# Patient Record
Sex: MALE | Race: BLACK OR AFRICAN AMERICAN | NOT HISPANIC OR LATINO | Employment: FULL TIME | URBAN - METROPOLITAN AREA
[De-identification: names, ages, dates, MRNs, and addresses within clinical notes are randomized per-mention and may not be internally consistent; named-entity substitution may affect disease eponyms.]

---

## 2022-09-24 ENCOUNTER — HOSPITAL ENCOUNTER (EMERGENCY)
Facility: HOSPITAL | Age: 27
Discharge: HOME | End: 2022-09-25
Attending: EMERGENCY MEDICINE | Admitting: EMERGENCY MEDICINE

## 2022-09-24 ENCOUNTER — APPOINTMENT (OUTPATIENT)
Dept: CT IMAGING | Facility: HOSPITAL | Age: 27
End: 2022-09-24

## 2022-09-24 ENCOUNTER — APPOINTMENT (OUTPATIENT)
Dept: GENERAL RADIOLOGY | Facility: HOSPITAL | Age: 27
End: 2022-09-24
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
## 2022-09-24 DIAGNOSIS — S09.90XA INJURY OF HEAD, INITIAL ENCOUNTER: ICD-10-CM

## 2022-09-24 DIAGNOSIS — S23.9XXA THORACIC SPRAIN: ICD-10-CM

## 2022-09-24 DIAGNOSIS — S13.9XXA NECK SPRAIN, INITIAL ENCOUNTER: ICD-10-CM

## 2022-09-24 PROCEDURE — 71250 CT THORAX DX C-: CPT

## 2022-09-24 PROCEDURE — 99283 EMERGENCY DEPT VISIT LOW MDM: CPT

## 2022-09-24 PROCEDURE — 70450 CT HEAD/BRAIN W/O DYE: CPT

## 2022-09-24 PROCEDURE — 72125 CT NECK SPINE W/O DYE: CPT

## 2022-09-25 VITALS
RESPIRATION RATE: 14 BRPM | HEART RATE: 72 BPM | HEIGHT: 70 IN | TEMPERATURE: 98.1 F | WEIGHT: 164.8 LBS | DIASTOLIC BLOOD PRESSURE: 78 MMHG | SYSTOLIC BLOOD PRESSURE: 118 MMHG | BODY MASS INDEX: 23.59 KG/M2 | OXYGEN SATURATION: 99 %

## 2022-09-25 RX ORDER — METHOCARBAMOL 500 MG/1
500 TABLET, FILM COATED ORAL 4 TIMES DAILY
Qty: 28 TABLET | Refills: 0 | Status: SHIPPED | OUTPATIENT
Start: 2022-09-25

## 2022-09-25 RX ORDER — MELOXICAM 15 MG/1
15 TABLET ORAL DAILY
Qty: 30 TABLET | Refills: 0 | Status: SHIPPED | OUTPATIENT
Start: 2022-09-25
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
## 2023-08-21 ENCOUNTER — TRANSCRIBE ORDERS (OUTPATIENT)
Dept: ADMINISTRATIVE | Facility: HOSPITAL | Age: 28
End: 2023-08-21
Payer: COMMERCIAL

## 2023-08-21 DIAGNOSIS — R00.2 PALPITATIONS: Primary | ICD-10-CM

## 2024-06-03 ENCOUNTER — TRANSCRIBE ORDERS (OUTPATIENT)
Dept: ADMINISTRATIVE | Facility: HOSPITAL | Age: 29
End: 2024-06-03
Payer: COMMERCIAL

## 2024-06-03 DIAGNOSIS — R91.1 LUNG NODULE: Primary | ICD-10-CM

## 2024-07-15 ENCOUNTER — HOSPITAL ENCOUNTER (OUTPATIENT)
Dept: CT IMAGING | Facility: HOSPITAL | Age: 29
Discharge: HOME | End: 2024-07-15
Admitting: INTERNAL MEDICINE
Payer: MEDICAID

## 2024-07-15 DIAGNOSIS — R91.1 LUNG NODULE: ICD-10-CM

## 2024-07-15 PROCEDURE — 71250 CT THORAX DX C-: CPT

## 2024-09-12 ENCOUNTER — ANESTHESIA (OUTPATIENT)
Dept: PERIOP | Facility: HOSPITAL | Age: 29
End: 2024-09-12
Payer: MEDICAID

## 2024-09-12 ENCOUNTER — HOSPITAL ENCOUNTER (OUTPATIENT)
Facility: HOSPITAL | Age: 29
Discharge: HOME | End: 2024-09-13
Attending: EMERGENCY MEDICINE
Payer: MEDICAID

## 2024-09-12 ENCOUNTER — APPOINTMENT (OUTPATIENT)
Dept: CT IMAGING | Facility: HOSPITAL | Age: 29
End: 2024-09-12
Payer: MEDICAID

## 2024-09-12 ENCOUNTER — APPOINTMENT (OUTPATIENT)
Dept: GENERAL RADIOLOGY | Facility: HOSPITAL | Age: 29
End: 2024-09-12
Payer: MEDICAID

## 2024-09-12 ENCOUNTER — ANESTHESIA EVENT (OUTPATIENT)
Dept: PERIOP | Facility: HOSPITAL | Age: 29
End: 2024-09-12
Payer: MEDICAID

## 2024-09-12 DIAGNOSIS — K37 APPENDICITIS: ICD-10-CM

## 2024-09-12 DIAGNOSIS — K35.200 ACUTE APPENDICITIS WITH GENERALIZED PERITONITIS WITHOUT GANGRENE, PERFORATION, OR ABSCESS: Primary | ICD-10-CM

## 2024-09-12 LAB
ABO GROUP BLD: NORMAL
ALBUMIN SERPL-MCNC: 4.3 G/DL (ref 3.5–5.2)
ALBUMIN SERPL-MCNC: 4.8 G/DL (ref 3.5–5.2)
ALBUMIN/GLOB SERPL: 1.5 G/DL
ALBUMIN/GLOB SERPL: 1.7 G/DL
ALP SERPL-CCNC: 50 U/L (ref 39–117)
ALP SERPL-CCNC: 54 U/L (ref 39–117)
ALT SERPL W P-5'-P-CCNC: 20 U/L (ref 1–41)
ALT SERPL W P-5'-P-CCNC: 20 U/L (ref 1–41)
ANION GAP SERPL CALCULATED.3IONS-SCNC: 11.7 MMOL/L (ref 5–15)
ANION GAP SERPL CALCULATED.3IONS-SCNC: 8.9 MMOL/L (ref 5–15)
APTT PPP: 28.9 SECONDS (ref 24–31)
AST SERPL-CCNC: 18 U/L (ref 1–40)
AST SERPL-CCNC: 24 U/L (ref 1–40)
BASOPHILS # BLD AUTO: 0.02 10*3/MM3 (ref 0–0.2)
BASOPHILS # BLD AUTO: 0.03 10*3/MM3 (ref 0–0.2)
BASOPHILS NFR BLD AUTO: 0.2 % (ref 0–1.5)
BASOPHILS NFR BLD AUTO: 0.2 % (ref 0–1.5)
BILIRUB SERPL-MCNC: 0.5 MG/DL (ref 0–1.2)
BILIRUB SERPL-MCNC: 0.7 MG/DL (ref 0–1.2)
BLD GP AB SCN SERPL QL: NEGATIVE
BUN SERPL-MCNC: 13 MG/DL (ref 6–20)
BUN SERPL-MCNC: 14 MG/DL (ref 6–20)
BUN/CREAT SERPL: 12.5 (ref 7–25)
BUN/CREAT SERPL: 14.4 (ref 7–25)
CALCIUM SPEC-SCNC: 10.6 MG/DL (ref 8.6–10.5)
CALCIUM SPEC-SCNC: 9.5 MG/DL (ref 8.6–10.5)
CHLORIDE SERPL-SCNC: 103 MMOL/L (ref 98–107)
CHLORIDE SERPL-SCNC: 99 MMOL/L (ref 98–107)
CO2 SERPL-SCNC: 26.1 MMOL/L (ref 22–29)
CO2 SERPL-SCNC: 27.3 MMOL/L (ref 22–29)
CREAT SERPL-MCNC: 0.97 MG/DL (ref 0.76–1.27)
CREAT SERPL-MCNC: 1.04 MG/DL (ref 0.76–1.27)
D-LACTATE SERPL-SCNC: 0.9 MMOL/L (ref 0.5–2)
DEPRECATED RDW RBC AUTO: 39.6 FL (ref 37–54)
DEPRECATED RDW RBC AUTO: 40.6 FL (ref 37–54)
EGFRCR SERPLBLD CKD-EPI 2021: 100.3 ML/MIN/1.73
EGFRCR SERPLBLD CKD-EPI 2021: 109 ML/MIN/1.73
EOSINOPHIL # BLD AUTO: 0.03 10*3/MM3 (ref 0–0.4)
EOSINOPHIL # BLD AUTO: 0.16 10*3/MM3 (ref 0–0.4)
EOSINOPHIL NFR BLD AUTO: 0.2 % (ref 0.3–6.2)
EOSINOPHIL NFR BLD AUTO: 1.3 % (ref 0.3–6.2)
ERYTHROCYTE [DISTWIDTH] IN BLOOD BY AUTOMATED COUNT: 12.3 % (ref 12.3–15.4)
ERYTHROCYTE [DISTWIDTH] IN BLOOD BY AUTOMATED COUNT: 12.3 % (ref 12.3–15.4)
GLOBULIN UR ELPH-MCNC: 2.8 GM/DL
GLOBULIN UR ELPH-MCNC: 2.8 GM/DL
GLUCOSE SERPL-MCNC: 113 MG/DL (ref 65–99)
GLUCOSE SERPL-MCNC: 98 MG/DL (ref 65–99)
HCT VFR BLD AUTO: 42.6 % (ref 37.5–51)
HCT VFR BLD AUTO: 47.6 % (ref 37.5–51)
HGB BLD-MCNC: 14.2 G/DL (ref 13–17.7)
HGB BLD-MCNC: 15.5 G/DL (ref 13–17.7)
IMM GRANULOCYTES # BLD AUTO: 0.01 10*3/MM3 (ref 0–0.05)
IMM GRANULOCYTES # BLD AUTO: 0.06 10*3/MM3 (ref 0–0.05)
IMM GRANULOCYTES NFR BLD AUTO: 0.1 % (ref 0–0.5)
IMM GRANULOCYTES NFR BLD AUTO: 0.5 % (ref 0–0.5)
INR PPP: 1.05 (ref 0.93–1.1)
LYMPHOCYTES # BLD AUTO: 1.04 10*3/MM3 (ref 0.7–3.1)
LYMPHOCYTES # BLD AUTO: 3.22 10*3/MM3 (ref 0.7–3.1)
LYMPHOCYTES NFR BLD AUTO: 25.6 % (ref 19.6–45.3)
LYMPHOCYTES NFR BLD AUTO: 7.9 % (ref 19.6–45.3)
MAGNESIUM SERPL-MCNC: 1.8 MG/DL (ref 1.6–2.6)
MCH RBC QN AUTO: 28.8 PG (ref 26.6–33)
MCH RBC QN AUTO: 29.2 PG (ref 26.6–33)
MCHC RBC AUTO-ENTMCNC: 32.6 G/DL (ref 31.5–35.7)
MCHC RBC AUTO-ENTMCNC: 33.3 G/DL (ref 31.5–35.7)
MCV RBC AUTO: 87.5 FL (ref 79–97)
MCV RBC AUTO: 88.5 FL (ref 79–97)
MONOCYTES # BLD AUTO: 0.72 10*3/MM3 (ref 0.1–0.9)
MONOCYTES # BLD AUTO: 0.93 10*3/MM3 (ref 0.1–0.9)
MONOCYTES NFR BLD AUTO: 5.4 % (ref 5–12)
MONOCYTES NFR BLD AUTO: 7.4 % (ref 5–12)
NEUTROPHILS NFR BLD AUTO: 11.37 10*3/MM3 (ref 1.7–7)
NEUTROPHILS NFR BLD AUTO: 65.4 % (ref 42.7–76)
NEUTROPHILS NFR BLD AUTO: 8.25 10*3/MM3 (ref 1.7–7)
NEUTROPHILS NFR BLD AUTO: 85.8 % (ref 42.7–76)
NRBC BLD AUTO-RTO: 0 /100 WBC (ref 0–0.2)
PHOSPHATE SERPL-MCNC: 2.9 MG/DL (ref 2.5–4.5)
PLATELET # BLD AUTO: 208 10*3/MM3 (ref 140–450)
PLATELET # BLD AUTO: 223 10*3/MM3 (ref 140–450)
PMV BLD AUTO: 9.6 FL (ref 6–12)
PMV BLD AUTO: 9.8 FL (ref 6–12)
POTASSIUM SERPL-SCNC: 3.7 MMOL/L (ref 3.5–5.2)
POTASSIUM SERPL-SCNC: 4.3 MMOL/L (ref 3.5–5.2)
PROT SERPL-MCNC: 7.1 G/DL (ref 6–8.5)
PROT SERPL-MCNC: 7.6 G/DL (ref 6–8.5)
PROTHROMBIN TIME: 11.4 SECONDS (ref 9.6–11.7)
QT INTERVAL: 403 MS
QTC INTERVAL: 435 MS
RBC # BLD AUTO: 4.87 10*6/MM3 (ref 4.14–5.8)
RBC # BLD AUTO: 5.38 10*6/MM3 (ref 4.14–5.8)
RH BLD: POSITIVE
SODIUM SERPL-SCNC: 138 MMOL/L (ref 136–145)
SODIUM SERPL-SCNC: 138 MMOL/L (ref 136–145)
T&S EXPIRATION DATE: NORMAL
WBC NRBC COR # BLD AUTO: 12.6 10*3/MM3 (ref 3.4–10.8)
WBC NRBC COR # BLD AUTO: 13.24 10*3/MM3 (ref 3.4–10.8)

## 2024-09-12 PROCEDURE — 25010000002 KETOROLAC TROMETHAMINE PER 15 MG: Performed by: EMERGENCY MEDICINE

## 2024-09-12 PROCEDURE — 25010000002 HYDROMORPHONE 1 MG/ML SOLUTION: Performed by: SURGERY

## 2024-09-12 PROCEDURE — 96365 THER/PROPH/DIAG IV INF INIT: CPT

## 2024-09-12 PROCEDURE — 74177 CT ABD & PELVIS W/CONTRAST: CPT

## 2024-09-12 PROCEDURE — 25010000002 MAGNESIUM SULFATE PER 500 MG OF MAGNESIUM

## 2024-09-12 PROCEDURE — 25010000002 GLYCOPYRROLATE 1 MG/5ML SOLUTION

## 2024-09-12 PROCEDURE — 25510000001 IOPAMIDOL PER 1 ML: Performed by: EMERGENCY MEDICINE

## 2024-09-12 PROCEDURE — 93005 ELECTROCARDIOGRAM TRACING: CPT

## 2024-09-12 PROCEDURE — 86850 RBC ANTIBODY SCREEN: CPT

## 2024-09-12 PROCEDURE — 80053 COMPREHEN METABOLIC PANEL: CPT | Performed by: EMERGENCY MEDICINE

## 2024-09-12 PROCEDURE — 86901 BLOOD TYPING SEROLOGIC RH(D): CPT

## 2024-09-12 PROCEDURE — 96375 TX/PRO/DX INJ NEW DRUG ADDON: CPT

## 2024-09-12 PROCEDURE — 85025 COMPLETE CBC W/AUTO DIFF WBC: CPT

## 2024-09-12 PROCEDURE — 88304 TISSUE EXAM BY PATHOLOGIST: CPT | Performed by: SURGERY

## 2024-09-12 PROCEDURE — 83605 ASSAY OF LACTIC ACID: CPT | Performed by: EMERGENCY MEDICINE

## 2024-09-12 PROCEDURE — 25010000002 MORPHINE PER 10 MG: Performed by: EMERGENCY MEDICINE

## 2024-09-12 PROCEDURE — G0378 HOSPITAL OBSERVATION PER HR: HCPCS

## 2024-09-12 PROCEDURE — 85025 COMPLETE CBC W/AUTO DIFF WBC: CPT | Performed by: EMERGENCY MEDICINE

## 2024-09-12 PROCEDURE — 85610 PROTHROMBIN TIME: CPT

## 2024-09-12 PROCEDURE — 25010000002 ONDANSETRON PER 1 MG

## 2024-09-12 PROCEDURE — 25010000002 FENTANYL CITRATE (PF) 100 MCG/2ML SOLUTION

## 2024-09-12 PROCEDURE — 86900 BLOOD TYPING SEROLOGIC ABO: CPT

## 2024-09-12 PROCEDURE — 25010000002 FENTANYL CITRATE (PF) 50 MCG/ML SOLUTION: Performed by: EMERGENCY MEDICINE

## 2024-09-12 PROCEDURE — 93010 ELECTROCARDIOGRAM REPORT: CPT | Performed by: INTERNAL MEDICINE

## 2024-09-12 PROCEDURE — 25010000002 DEXAMETHASONE PER 1 MG

## 2024-09-12 PROCEDURE — 25810000003 LACTATED RINGERS PER 1000 ML

## 2024-09-12 PROCEDURE — 83735 ASSAY OF MAGNESIUM: CPT

## 2024-09-12 PROCEDURE — 25810000003 SODIUM CHLORIDE 0.9 % SOLUTION: Performed by: SURGERY

## 2024-09-12 PROCEDURE — 25010000002 SUGAMMADEX 200 MG/2ML SOLUTION

## 2024-09-12 PROCEDURE — 25010000002 METOCLOPRAMIDE PER 10 MG: Performed by: EMERGENCY MEDICINE

## 2024-09-12 PROCEDURE — 94799 UNLISTED PULMONARY SVC/PX: CPT

## 2024-09-12 PROCEDURE — 25010000002 CEFOXITIN PER 1 G: Performed by: SURGERY

## 2024-09-12 PROCEDURE — 80053 COMPREHEN METABOLIC PANEL: CPT

## 2024-09-12 PROCEDURE — 25010000002 BUPIVACAINE 0.5 % SOLUTION: Performed by: SURGERY

## 2024-09-12 PROCEDURE — 94640 AIRWAY INHALATION TREATMENT: CPT

## 2024-09-12 PROCEDURE — 84100 ASSAY OF PHOSPHORUS: CPT

## 2024-09-12 PROCEDURE — 99285 EMERGENCY DEPT VISIT HI MDM: CPT

## 2024-09-12 PROCEDURE — 99204 OFFICE O/P NEW MOD 45 MIN: CPT | Performed by: SURGERY

## 2024-09-12 PROCEDURE — 44970 LAPAROSCOPY APPENDECTOMY: CPT | Performed by: SURGERY

## 2024-09-12 PROCEDURE — 25010000002 DIPHENHYDRAMINE PER 50 MG: Performed by: EMERGENCY MEDICINE

## 2024-09-12 PROCEDURE — 25010000002 HYDROMORPHONE 1 MG/ML SOLUTION

## 2024-09-12 PROCEDURE — 71045 X-RAY EXAM CHEST 1 VIEW: CPT

## 2024-09-12 PROCEDURE — 25810000003 SODIUM CHLORIDE 0.9 % SOLUTION: Performed by: EMERGENCY MEDICINE

## 2024-09-12 PROCEDURE — 25010000002 MIDAZOLAM PER 1 MG

## 2024-09-12 PROCEDURE — 25010000002 PROPOFOL 200 MG/20ML EMULSION

## 2024-09-12 PROCEDURE — 96376 TX/PRO/DX INJ SAME DRUG ADON: CPT

## 2024-09-12 PROCEDURE — 85730 THROMBOPLASTIN TIME PARTIAL: CPT

## 2024-09-12 PROCEDURE — 25010000002 PIPERACILLIN SOD-TAZOBACTAM PER 1 G: Performed by: EMERGENCY MEDICINE

## 2024-09-12 DEVICE — ENDOPATH ETS45 2.5MM RELOADS (VASCULAR/THIN)
Type: IMPLANTABLE DEVICE | Site: ABDOMEN | Status: FUNCTIONAL
Brand: ENDOPATH

## 2024-09-12 RX ORDER — IOPAMIDOL 755 MG/ML
100 INJECTION, SOLUTION INTRAVASCULAR
Status: COMPLETED | OUTPATIENT
Start: 2024-09-12 | End: 2024-09-12

## 2024-09-12 RX ORDER — ACETAMINOPHEN 325 MG/1
650 TABLET ORAL ONCE AS NEEDED
Status: DISCONTINUED | OUTPATIENT
Start: 2024-09-12 | End: 2024-09-12 | Stop reason: HOSPADM

## 2024-09-12 RX ORDER — ACETAMINOPHEN 325 MG/1
650 TABLET ORAL EVERY 4 HOURS PRN
Status: DISCONTINUED | OUTPATIENT
Start: 2024-09-12 | End: 2024-09-13 | Stop reason: HOSPADM

## 2024-09-12 RX ORDER — PROPOFOL 10 MG/ML
INJECTION, EMULSION INTRAVENOUS AS NEEDED
Status: DISCONTINUED | OUTPATIENT
Start: 2024-09-12 | End: 2024-09-12 | Stop reason: SURG

## 2024-09-12 RX ORDER — NALOXONE HCL 0.4 MG/ML
0.4 VIAL (ML) INJECTION AS NEEDED
Status: DISCONTINUED | OUTPATIENT
Start: 2024-09-12 | End: 2024-09-12 | Stop reason: HOSPADM

## 2024-09-12 RX ORDER — HYDRALAZINE HYDROCHLORIDE 20 MG/ML
5 INJECTION INTRAMUSCULAR; INTRAVENOUS
Status: DISCONTINUED | OUTPATIENT
Start: 2024-09-12 | End: 2024-09-12 | Stop reason: HOSPADM

## 2024-09-12 RX ORDER — TEZEPELUMAB-EKKO 210 MG/1.9ML
210 INJECTION, SOLUTION SUBCUTANEOUS
COMMUNITY

## 2024-09-12 RX ORDER — ACETAMINOPHEN 650 MG/1
650 SUPPOSITORY RECTAL EVERY 4 HOURS PRN
Status: DISCONTINUED | OUTPATIENT
Start: 2024-09-12 | End: 2024-09-13 | Stop reason: HOSPADM

## 2024-09-12 RX ORDER — SODIUM CHLORIDE 0.9 % (FLUSH) 0.9 %
10 SYRINGE (ML) INJECTION AS NEEDED
Status: DISCONTINUED | OUTPATIENT
Start: 2024-09-12 | End: 2024-09-13 | Stop reason: HOSPADM

## 2024-09-12 RX ORDER — DEXMEDETOMIDINE HYDROCHLORIDE 100 UG/ML
INJECTION, SOLUTION INTRAVENOUS AS NEEDED
Status: DISCONTINUED | OUTPATIENT
Start: 2024-09-12 | End: 2024-09-12 | Stop reason: SURG

## 2024-09-12 RX ORDER — SODIUM CHLORIDE 9 MG/ML
40 INJECTION, SOLUTION INTRAVENOUS AS NEEDED
Status: DISCONTINUED | OUTPATIENT
Start: 2024-09-12 | End: 2024-09-13 | Stop reason: HOSPADM

## 2024-09-12 RX ORDER — ALBUTEROL SULFATE 0.83 MG/ML
2.5 SOLUTION RESPIRATORY (INHALATION) EVERY 6 HOURS PRN
Status: DISCONTINUED | OUTPATIENT
Start: 2024-09-12 | End: 2024-09-13 | Stop reason: HOSPADM

## 2024-09-12 RX ORDER — HYDROCODONE BITARTRATE AND ACETAMINOPHEN 7.5; 325 MG/1; MG/1
1 TABLET ORAL EVERY 4 HOURS PRN
Status: DISCONTINUED | OUTPATIENT
Start: 2024-09-12 | End: 2024-09-13 | Stop reason: HOSPADM

## 2024-09-12 RX ORDER — METOCLOPRAMIDE HYDROCHLORIDE 5 MG/ML
10 INJECTION INTRAMUSCULAR; INTRAVENOUS ONCE
Status: COMPLETED | OUTPATIENT
Start: 2024-09-12 | End: 2024-09-12

## 2024-09-12 RX ORDER — BUPIVACAINE HYDROCHLORIDE 5 MG/ML
INJECTION, SOLUTION PERINEURAL AS NEEDED
Status: DISCONTINUED | OUTPATIENT
Start: 2024-09-12 | End: 2024-09-12 | Stop reason: HOSPADM

## 2024-09-12 RX ORDER — POLYETHYLENE GLYCOL 3350 17 G/17G
17 POWDER, FOR SOLUTION ORAL DAILY PRN
Status: DISCONTINUED | OUTPATIENT
Start: 2024-09-12 | End: 2024-09-13 | Stop reason: HOSPADM

## 2024-09-12 RX ORDER — LIDOCAINE HYDROCHLORIDE 20 MG/ML
INJECTION, SOLUTION EPIDURAL; INFILTRATION; INTRACAUDAL; PERINEURAL AS NEEDED
Status: DISCONTINUED | OUTPATIENT
Start: 2024-09-12 | End: 2024-09-12 | Stop reason: SURG

## 2024-09-12 RX ORDER — BISACODYL 5 MG/1
5 TABLET, DELAYED RELEASE ORAL DAILY PRN
Status: DISCONTINUED | OUTPATIENT
Start: 2024-09-12 | End: 2024-09-13 | Stop reason: HOSPADM

## 2024-09-12 RX ORDER — DIPHENHYDRAMINE HYDROCHLORIDE 50 MG/ML
12.5 INJECTION INTRAMUSCULAR; INTRAVENOUS
Status: DISCONTINUED | OUTPATIENT
Start: 2024-09-12 | End: 2024-09-12 | Stop reason: HOSPADM

## 2024-09-12 RX ORDER — AMOXICILLIN 250 MG
2 CAPSULE ORAL 2 TIMES DAILY
Status: DISCONTINUED | OUTPATIENT
Start: 2024-09-12 | End: 2024-09-13 | Stop reason: HOSPADM

## 2024-09-12 RX ORDER — MONTELUKAST SODIUM 10 MG/1
10 TABLET ORAL NIGHTLY
Status: DISCONTINUED | OUTPATIENT
Start: 2024-09-12 | End: 2024-09-13 | Stop reason: HOSPADM

## 2024-09-12 RX ORDER — FENTANYL CITRATE 50 UG/ML
INJECTION, SOLUTION INTRAMUSCULAR; INTRAVENOUS AS NEEDED
Status: DISCONTINUED | OUTPATIENT
Start: 2024-09-12 | End: 2024-09-12 | Stop reason: SURG

## 2024-09-12 RX ORDER — ONDANSETRON 2 MG/ML
INJECTION INTRAMUSCULAR; INTRAVENOUS AS NEEDED
Status: DISCONTINUED | OUTPATIENT
Start: 2024-09-12 | End: 2024-09-12 | Stop reason: SURG

## 2024-09-12 RX ORDER — ONDANSETRON 2 MG/ML
4 INJECTION INTRAMUSCULAR; INTRAVENOUS ONCE AS NEEDED
Status: DISCONTINUED | OUTPATIENT
Start: 2024-09-12 | End: 2024-09-12 | Stop reason: HOSPADM

## 2024-09-12 RX ORDER — FENTANYL CITRATE 50 UG/ML
75 INJECTION, SOLUTION INTRAMUSCULAR; INTRAVENOUS ONCE
Status: COMPLETED | OUTPATIENT
Start: 2024-09-12 | End: 2024-09-12

## 2024-09-12 RX ORDER — BISACODYL 10 MG
10 SUPPOSITORY, RECTAL RECTAL DAILY PRN
Status: DISCONTINUED | OUTPATIENT
Start: 2024-09-12 | End: 2024-09-13 | Stop reason: HOSPADM

## 2024-09-12 RX ORDER — MIDAZOLAM HYDROCHLORIDE 1 MG/ML
INJECTION INTRAMUSCULAR; INTRAVENOUS AS NEEDED
Status: DISCONTINUED | OUTPATIENT
Start: 2024-09-12 | End: 2024-09-12 | Stop reason: SURG

## 2024-09-12 RX ORDER — IPRATROPIUM BROMIDE AND ALBUTEROL SULFATE 2.5; .5 MG/3ML; MG/3ML
SOLUTION RESPIRATORY (INHALATION) AS NEEDED
Status: DISCONTINUED | OUTPATIENT
Start: 2024-09-12 | End: 2024-09-12 | Stop reason: SURG

## 2024-09-12 RX ORDER — DIPHENHYDRAMINE HYDROCHLORIDE 50 MG/ML
25 INJECTION INTRAMUSCULAR; INTRAVENOUS ONCE
Status: COMPLETED | OUTPATIENT
Start: 2024-09-12 | End: 2024-09-12

## 2024-09-12 RX ORDER — KETOROLAC TROMETHAMINE 30 MG/ML
30 INJECTION, SOLUTION INTRAMUSCULAR; INTRAVENOUS ONCE
Status: COMPLETED | OUTPATIENT
Start: 2024-09-12 | End: 2024-09-12

## 2024-09-12 RX ORDER — FENTANYL CITRATE 50 UG/ML
50 INJECTION, SOLUTION INTRAMUSCULAR; INTRAVENOUS
Status: DISCONTINUED | OUTPATIENT
Start: 2024-09-12 | End: 2024-09-12 | Stop reason: HOSPADM

## 2024-09-12 RX ORDER — SODIUM CHLORIDE 9 MG/ML
125 INJECTION, SOLUTION INTRAVENOUS CONTINUOUS
Status: DISCONTINUED | OUTPATIENT
Start: 2024-09-12 | End: 2024-09-13 | Stop reason: HOSPADM

## 2024-09-12 RX ORDER — DEXAMETHASONE SODIUM PHOSPHATE 4 MG/ML
INJECTION, SOLUTION INTRA-ARTICULAR; INTRALESIONAL; INTRAMUSCULAR; INTRAVENOUS; SOFT TISSUE AS NEEDED
Status: DISCONTINUED | OUTPATIENT
Start: 2024-09-12 | End: 2024-09-12 | Stop reason: SURG

## 2024-09-12 RX ORDER — ONDANSETRON 4 MG/1
4 TABLET, ORALLY DISINTEGRATING ORAL EVERY 6 HOURS PRN
Status: DISCONTINUED | OUTPATIENT
Start: 2024-09-12 | End: 2024-09-13 | Stop reason: HOSPADM

## 2024-09-12 RX ORDER — SODIUM CHLORIDE 0.9 % (FLUSH) 0.9 %
10 SYRINGE (ML) INJECTION EVERY 12 HOURS SCHEDULED
Status: DISCONTINUED | OUTPATIENT
Start: 2024-09-12 | End: 2024-09-13 | Stop reason: HOSPADM

## 2024-09-12 RX ORDER — ROCURONIUM BROMIDE 10 MG/ML
INJECTION, SOLUTION INTRAVENOUS AS NEEDED
Status: DISCONTINUED | OUTPATIENT
Start: 2024-09-12 | End: 2024-09-12 | Stop reason: SURG

## 2024-09-12 RX ORDER — SODIUM CHLORIDE, SODIUM LACTATE, POTASSIUM CHLORIDE, CALCIUM CHLORIDE 600; 310; 30; 20 MG/100ML; MG/100ML; MG/100ML; MG/100ML
INJECTION, SOLUTION INTRAVENOUS CONTINUOUS PRN
Status: DISCONTINUED | OUTPATIENT
Start: 2024-09-12 | End: 2024-09-12 | Stop reason: SURG

## 2024-09-12 RX ORDER — MAGNESIUM SULFATE HEPTAHYDRATE 500 MG/ML
INJECTION, SOLUTION INTRAMUSCULAR; INTRAVENOUS AS NEEDED
Status: DISCONTINUED | OUTPATIENT
Start: 2024-09-12 | End: 2024-09-12 | Stop reason: SURG

## 2024-09-12 RX ORDER — MONTELUKAST SODIUM 10 MG/1
10 TABLET ORAL NIGHTLY
COMMUNITY

## 2024-09-12 RX ORDER — NALOXONE HCL 0.4 MG/ML
0.1 VIAL (ML) INJECTION
Status: DISCONTINUED | OUTPATIENT
Start: 2024-09-12 | End: 2024-09-13 | Stop reason: HOSPADM

## 2024-09-12 RX ORDER — LABETALOL HYDROCHLORIDE 5 MG/ML
5 INJECTION, SOLUTION INTRAVENOUS
Status: DISCONTINUED | OUTPATIENT
Start: 2024-09-12 | End: 2024-09-12 | Stop reason: HOSPADM

## 2024-09-12 RX ORDER — FENTANYL CITRATE 50 UG/ML
25 INJECTION, SOLUTION INTRAMUSCULAR; INTRAVENOUS
Status: DISCONTINUED | OUTPATIENT
Start: 2024-09-12 | End: 2024-09-12 | Stop reason: HOSPADM

## 2024-09-12 RX ORDER — PHENYLEPHRINE HCL IN 0.9% NACL 1 MG/10 ML
SYRINGE (ML) INTRAVENOUS AS NEEDED
Status: DISCONTINUED | OUTPATIENT
Start: 2024-09-12 | End: 2024-09-12 | Stop reason: SURG

## 2024-09-12 RX ORDER — CETIRIZINE HYDROCHLORIDE 10 MG/1
10 TABLET ORAL DAILY
COMMUNITY

## 2024-09-12 RX ORDER — ALBUTEROL SULFATE 0.83 MG/ML
2.5 SOLUTION RESPIRATORY (INHALATION) ONCE AS NEEDED
Status: DISCONTINUED | OUTPATIENT
Start: 2024-09-12 | End: 2024-09-12 | Stop reason: HOSPADM

## 2024-09-12 RX ORDER — ALBUTEROL SULFATE 0.63 MG/3ML
0.63 SOLUTION RESPIRATORY (INHALATION) EVERY 6 HOURS PRN
Status: DISCONTINUED | OUTPATIENT
Start: 2024-09-12 | End: 2024-09-12 | Stop reason: HOSPADM

## 2024-09-12 RX ORDER — ONDANSETRON 2 MG/ML
4 INJECTION INTRAMUSCULAR; INTRAVENOUS EVERY 6 HOURS PRN
Status: DISCONTINUED | OUTPATIENT
Start: 2024-09-12 | End: 2024-09-13 | Stop reason: HOSPADM

## 2024-09-12 RX ORDER — GLYCOPYRROLATE 0.2 MG/ML
INJECTION INTRAMUSCULAR; INTRAVENOUS AS NEEDED
Status: DISCONTINUED | OUTPATIENT
Start: 2024-09-12 | End: 2024-09-12 | Stop reason: SURG

## 2024-09-12 RX ADMIN — DIPHENHYDRAMINE HYDROCHLORIDE 25 MG: 50 INJECTION, SOLUTION INTRAMUSCULAR; INTRAVENOUS at 01:05

## 2024-09-12 RX ADMIN — Medication 100 MCG: at 11:56

## 2024-09-12 RX ADMIN — DIPHENHYDRAMINE HYDROCHLORIDE 25 MG: 50 INJECTION, SOLUTION INTRAMUSCULAR; INTRAVENOUS at 02:39

## 2024-09-12 RX ADMIN — FENTANYL CITRATE 50 MCG: 50 INJECTION, SOLUTION INTRAMUSCULAR; INTRAVENOUS at 11:03

## 2024-09-12 RX ADMIN — HYDROMORPHONE HYDROCHLORIDE 0.5 MG: 1 INJECTION, SOLUTION INTRAMUSCULAR; INTRAVENOUS; SUBCUTANEOUS at 11:41

## 2024-09-12 RX ADMIN — DEXMEDETOMIDINE HYDROCHLORIDE 2 MCG: 100 INJECTION, SOLUTION INTRAVENOUS at 11:46

## 2024-09-12 RX ADMIN — PROPOFOL 200 MG: 10 INJECTION, EMULSION INTRAVENOUS at 10:48

## 2024-09-12 RX ADMIN — MAGNESIUM SULFATE HEPTAHYDRATE 1 G: 500 INJECTION, SOLUTION INTRAMUSCULAR; INTRAVENOUS at 11:04

## 2024-09-12 RX ADMIN — ALBUTEROL SULFATE 2.5 MG: 2.5 SOLUTION RESPIRATORY (INHALATION) at 19:05

## 2024-09-12 RX ADMIN — DEXMEDETOMIDINE HYDROCHLORIDE 4 MCG: 100 INJECTION, SOLUTION INTRAVENOUS at 11:36

## 2024-09-12 RX ADMIN — Medication 100 MCG: at 11:41

## 2024-09-12 RX ADMIN — SODIUM CHLORIDE 125 ML/HR: 9 INJECTION, SOLUTION INTRAVENOUS at 14:03

## 2024-09-12 RX ADMIN — KETOROLAC TROMETHAMINE 30 MG: 30 INJECTION, SOLUTION INTRAMUSCULAR at 02:38

## 2024-09-12 RX ADMIN — FENTANYL CITRATE 50 MCG: 50 INJECTION, SOLUTION INTRAMUSCULAR; INTRAVENOUS at 10:48

## 2024-09-12 RX ADMIN — HYDROMORPHONE HYDROCHLORIDE 0.5 MG: 1 INJECTION, SOLUTION INTRAMUSCULAR; INTRAVENOUS; SUBCUTANEOUS at 17:50

## 2024-09-12 RX ADMIN — HYDROMORPHONE HYDROCHLORIDE 0.5 MG: 1 INJECTION, SOLUTION INTRAMUSCULAR; INTRAVENOUS; SUBCUTANEOUS at 14:04

## 2024-09-12 RX ADMIN — MIDAZOLAM 2 MG: 1 INJECTION INTRAMUSCULAR; INTRAVENOUS at 10:44

## 2024-09-12 RX ADMIN — SODIUM CHLORIDE, SODIUM LACTATE, POTASSIUM CHLORIDE, AND CALCIUM CHLORIDE: .6; .31; .03; .02 INJECTION, SOLUTION INTRAVENOUS at 10:44

## 2024-09-12 RX ADMIN — CEFOXITIN SODIUM 2000 MG: 2 POWDER, FOR SOLUTION INTRAVENOUS at 10:40

## 2024-09-12 RX ADMIN — IOPAMIDOL 100 ML: 755 INJECTION, SOLUTION INTRAVENOUS at 01:13

## 2024-09-12 RX ADMIN — DEXAMETHASONE SODIUM PHOSPHATE 8 MG: 4 INJECTION, SOLUTION INTRAMUSCULAR; INTRAVENOUS at 10:54

## 2024-09-12 RX ADMIN — SODIUM CHLORIDE 1000 ML: 0.9 INJECTION, SOLUTION INTRAVENOUS at 01:08

## 2024-09-12 RX ADMIN — GLYCOPYRROLATE 0.2 MG: 0.2 INJECTION INTRAMUSCULAR; INTRAVENOUS at 11:26

## 2024-09-12 RX ADMIN — METOCLOPRAMIDE 10 MG: 5 INJECTION, SOLUTION INTRAMUSCULAR; INTRAVENOUS at 01:06

## 2024-09-12 RX ADMIN — LIDOCAINE HYDROCHLORIDE 100 MG: 20 INJECTION, SOLUTION EPIDURAL; INFILTRATION; INTRACAUDAL; PERINEURAL at 10:48

## 2024-09-12 RX ADMIN — HYDROMORPHONE HYDROCHLORIDE 0.5 MG: 1 INJECTION, SOLUTION INTRAMUSCULAR; INTRAVENOUS; SUBCUTANEOUS at 22:08

## 2024-09-12 RX ADMIN — MORPHINE SULFATE 4 MG: 4 INJECTION, SOLUTION INTRAMUSCULAR; INTRAVENOUS at 01:02

## 2024-09-12 RX ADMIN — SENNOSIDES AND DOCUSATE SODIUM 2 TABLET: 50; 8.6 TABLET ORAL at 20:05

## 2024-09-12 RX ADMIN — PIPERACILLIN AND TAZOBACTAM 3.38 G: 3; .375 INJECTION, POWDER, FOR SOLUTION INTRAVENOUS at 02:42

## 2024-09-12 RX ADMIN — CEFOXITIN SODIUM 2000 MG: 2 POWDER, FOR SOLUTION INTRAVENOUS at 17:38

## 2024-09-12 RX ADMIN — Medication 100 MCG: at 11:27

## 2024-09-12 RX ADMIN — HYDROMORPHONE HYDROCHLORIDE 0.5 MG: 1 INJECTION, SOLUTION INTRAMUSCULAR; INTRAVENOUS; SUBCUTANEOUS at 12:00

## 2024-09-12 RX ADMIN — MONTELUKAST 10 MG: 10 TABLET, FILM COATED ORAL at 17:35

## 2024-09-12 RX ADMIN — IPRATROPIUM BROMIDE AND ALBUTEROL SULFATE 3 ML: .5; 3 SOLUTION RESPIRATORY (INHALATION) at 10:57

## 2024-09-12 RX ADMIN — ROCURONIUM BROMIDE 50 MG: 10 INJECTION, SOLUTION INTRAVENOUS at 10:48

## 2024-09-12 RX ADMIN — Medication 10 ML: at 20:06

## 2024-09-12 RX ADMIN — SODIUM CHLORIDE, SODIUM LACTATE, POTASSIUM CHLORIDE, AND CALCIUM CHLORIDE: .6; .31; .03; .02 INJECTION, SOLUTION INTRAVENOUS at 11:42

## 2024-09-12 RX ADMIN — HYDROMORPHONE HYDROCHLORIDE 0.5 MG: 1 INJECTION, SOLUTION INTRAMUSCULAR; INTRAVENOUS; SUBCUTANEOUS at 20:05

## 2024-09-12 RX ADMIN — ONDANSETRON 4 MG: 2 INJECTION INTRAMUSCULAR; INTRAVENOUS at 11:51

## 2024-09-12 RX ADMIN — FENTANYL CITRATE 75 MCG: 50 INJECTION, SOLUTION INTRAMUSCULAR; INTRAVENOUS at 02:48

## 2024-09-12 RX ADMIN — ROCURONIUM BROMIDE 10 MG: 10 INJECTION, SOLUTION INTRAVENOUS at 11:31

## 2024-09-12 RX ADMIN — SUGAMMADEX 200 MG: 100 INJECTION, SOLUTION INTRAVENOUS at 11:57

## 2024-09-12 RX ADMIN — DEXMEDETOMIDINE HYDROCHLORIDE 4 MCG: 100 INJECTION, SOLUTION INTRAVENOUS at 11:12

## 2024-09-12 NOTE — ANESTHESIA POSTPROCEDURE EVALUATION
Patient: Fernando Thompson    Procedure Summary       Date: 09/12/24 Room / Location: [facility] OR 10 / [facility] MAIN OR    Anesthesia Start: 1044 Anesthesia Stop: 1219    Procedure: APPENDECTOMY LAPAROSCOPIC (Abdomen) Diagnosis:     Surgeons: Issac Gray MD Provider: Bola Tee MD    Anesthesia Type: general ASA Status: 2            Anesthesia Type: general    Vitals  Vitals Value Taken Time   /58 09/12/24 1304   Temp 97.8 °F (36.6 °C) 09/12/24 1217   Pulse 104 09/12/24 1305   Resp 13 09/12/24 1232   SpO2 96 % 09/12/24 1305   Vitals shown include unfiled device data.        Post Anesthesia Care and Evaluation    Patient location during evaluation: PACU  Patient participation: complete - patient participated  Level of consciousness: awake  Pain scale: See nurse's notes for pain score.  Pain management: adequate    Airway patency: patent  Anesthetic complications: No anesthetic complications  PONV Status: none  Cardiovascular status: acceptable  Respiratory status: acceptable and spontaneous ventilation  Hydration status: acceptable    Comments: Patient seen and examined postoperatively; vital signs stable; SpO2 greater than or equal to 90%; cardiopulmonary status stable; nausea/vomiting adequately controlled; pain adequately controlled; no apparent anesthesia complications; patient discharged from anesthesia care when discharge criteria were met

## 2024-09-12 NOTE — LETTER
September 13, 2024     Patient: Fernando Thompson   YOB: 1995   Date of Visit: 9/12/2024       To Whom It May Concern:    It is my medical opinion that Fernando Thompson may return to work on Monday, September 16, 2024 .           Sincerely,        MD Ellis/CHRIS Samuels    CC: No Recipients

## 2024-09-12 NOTE — OP NOTE
Operative Note    Fernando Thompson  9/12/2024    Pre-op Diagnosis:   Acute appendicitis    Post-op Diagnosis:     Acute appendicitis    Procedure/CPT® Codes:      Procedure(s):  APPENDECTOMY LAPAROSCOPIC    Surgeon(s):  Issac Gray MD    Anesthesia: General    Staff:   Circulator: Lisa Amado RN; Lulú Beal, RN; London Reeves, RN  Scrub Person: Chikis Krishnamurthy RN; Jaycee Merlos  Assistant: Oswaldo Pitt CSA    Estimated Blood Loss: 100 mL    Specimens:                ID Type Source Tests Collected by Time   A :  Tissue Large Intestine, Appendix TISSUE PATHOLOGY EXAM Issac Gray MD 9/12/2024 1119         Drains:   [REMOVED] Urethral Catheter Silicone 16 Fr. (Removed)       Findings: Acutely inflamed retrocecal appendix without evidence of gross perforation    Complications: None apparent    Indication: Acute appendicitis, right lower quadrant abdominal pain    Operative Note:    The patient was seen and consent was obtained preoperatively.  Following this he was brought to the operating room and placed in supine position on the OR table.  General anesthetic was administered and the patient was orotracheal intubated without incident.  A Green catheter was placed sterilely by the nursing staff.  A briefing was performed.  The abdomen was prepped and draped in normal sterile fashion.  A timeout was then performed.    Local anesthetic was then injected below the umbilicus where a curvilinear incision was made and sharp dissection was used to find the juncture of the umbilical stalk with the abdominal wall fascia.  The umbilical stalk was grasped, elevated upward and the abdominal wall fascia adjacent to this was incised with a scalpel.  The abdomen was then entered bluntly with a Metzenbaum scissor and a 5 mm trocar was then placed through this defect.  The abdomen was then insufflated without issue.  Additional ports were then placed.  A 5 mm port was placed in the low midline  of the abdomen after injection of local and under direct visualization.  The periumbilical site was inspected and found to be without injury.  The site was then upsized to a 12 mm port.  The 5 mm port was then placed in the left lower quadrant under direct visualization and after injection of local anesthetic.  The patient was then positioned in Trendelenburg right side up position.  The terminal ileum was identified going up towards the cecum and right colon.  The terminal ileum appeared to be fused at the pelvic sidewall.  The cecum and right colon appeared to be fused in this area as well.  The appendix was not visible, despite good visualization of the cecum.  Therefore using combination of sharp dissection with scissors as well as the LigaSure device the peritoneal attachments of the terminal ileum as well as the cecum and right colon to the abdominal wall were divided.  The right colon and cecum were then mobilized in the medial and upward direction so that the cecum could be visualized on his posterior service.  A dilated and inflamed appendix was seen in this area.  The base of the appendix was identified using careful blunt dissection.  The appendix was then elevated upward by the base and a mesenteric defect was created between the base of the appendix and the cecum.  A stapler was then used to divide the appendix at its base.  Given the dilation, inflammation as well as its proximity to the colon careful dissection using both the LigaSure device as well as scissors and blunt dissection was then required to remove the inflamed appendix from the posterior aspect of the cecum and right colon.  Once the appendix was  away from the colon and its mesentery had been divided with the LigaSure device was placed into a bag and retrieved through the umbilical port site.  The operative field was inspected and appeared to be hemostatic.    The 12 mm port site under the umbilicus was then closed using a  figure-of-eight 0 Vicryl suture.  The abdomen was reinsufflated and the area was inspected and found to be well-closed.  The abdomen was then completely desufflated.  The remaining ports were removed.  The skin incisions were closed with 4-0 Vicryl and covered with skin glue.  The Green catheter was removed.  The patient was awakened and returned to recovery room.    Assistant: Oswaldo Pitt CSA was responsible for performing the following activities: Closing, Placing Dressing, and Held/Positioned Camera and their skilled assistance was necessary for the success of this case.          This document has been electronically signed by Issac Gray MD on September 12, 2024 12:44 EDT      Issac Gray MD     Date: 9/12/2024  Time: 12:40 EDT

## 2024-09-12 NOTE — CONSULTS
GENERAL SURGERY CONSULT    Referring Provider: Eugene  Reason for Consultation: appendicitis    Patient Care Team:  Provider, No Known as PCP - General    Chief complaint abdominal pain    Subjective .     History of present illness:  27 yo man who presented to the ER overnight with complaints of right lower quadrant abdominal pain which began Tuesday evening as mild general abdominal pain.  On Wednesday the pain worsened and began localizing to the right lower quadrant.  The patient had associated loss of appetite.  He also states that he felt constipated.  In the emergency department the patient was noted to have a mild leukocytosis.  A CT scan was performed showing acute uncomplicated appendicitis which prompted his admission and my consultation.  At the time of my visit the patient continues to have some pain in the area.  He has had no prior abdominal operations.    Review of Systems    Review of Systems   Gastrointestinal:  Positive for abdominal pain and constipation.         History  History reviewed. No pertinent past medical history., History reviewed. No pertinent surgical history., History reviewed. No pertinent family history.,   Social History     Tobacco Use    Smoking status: Never     Passive exposure: Never    Smokeless tobacco: Never   Vaping Use    Vaping status: Never Used   Substance Use Topics    Alcohol use: Never    Drug use: Never   ,   Medications Prior to Admission   Medication Sig Dispense Refill Last Dose    cetirizine (zyrTEC) 10 MG tablet Take 1 tablet by mouth Daily.   Past Week    ibuprofen (ADVIL,MOTRIN) 600 MG tablet Take 1 tablet by mouth Every 6 (Six) Hours As Needed for Moderate Pain. 30 tablet 0 9/11/2024    montelukast (SINGULAIR) 10 MG tablet Take 1 tablet by mouth Every Night.   9/11/2024    Tezepelumab-ekko (Tezspire) 210 MG/1.91ML injection Inject 1.91 mL under the skin into the appropriate area as directed Every 28 (Twenty-Eight) Days.   More than a month   , Scheduled  Meds:  ceFOXitin, 2,000 mg, Intravenous, On Call to OR  montelukast, 10 mg, Oral, Nightly  senna-docusate sodium, 2 tablet, Oral, BID  sodium chloride, 10 mL, Intravenous, Q12H    , Continuous Infusions:   , PRN Meds:    albuterol    senna-docusate sodium **AND** polyethylene glycol **AND** bisacodyl **AND** bisacodyl    Calcium Replacement - Follow Nurse / BPA Driven Protocol    Magnesium Standard Dose Replacement - Follow Nurse / BPA Driven Protocol    Phosphorus Replacement - Follow Nurse / BPA Driven Protocol    Potassium Replacement - Follow Nurse / BPA Driven Protocol    sodium chloride    sodium chloride and Allergies:  Patient has no known allergies.    Objective     Vital Signs   Temp:  [97.6 °F (36.4 °C)-98.6 °F (37 °C)] 97.6 °F (36.4 °C)  Heart Rate:  [] 61  Resp:  [11-19] 19  BP: (103-177)/(46-96) 177/66    Physical Exam:       General Appearance:    Alert, cooperative, in no acute distress   Head:    Normocephalic, without obvious abnormality, atraumatic   Eyes:            Lids and lashes normal, conjunctivae and sclerae normal, no   icterus, no pallor, corneas clear   Ears:    Ears appear intact with no abnormalities noted   Lungs:     Breathing unlabored   Abdomen:     Soft, RLQ tender, no masses, no hernias   Extremities:   Moves all extremities well   Skin:   No bleeding, bruising or rash   Neurologic:   Cranial nerves 2 - 12 grossly intact, sensation intact       Results Review:  Lab Results (last 72 hours)       Procedure Component Value Units Date/Time    Comprehensive Metabolic Panel [[id number]]  (Abnormal) Collected: 09/12/24 0547    Specimen: Blood from Arm, Right Updated: 09/12/24 0692     Glucose 113 mg/dL      BUN 13 mg/dL      Creatinine 1.04 mg/dL      Sodium 138 mmol/L      Potassium 4.3 mmol/L      Comment: Slight hemolysis detected by analyzer. Result may be falsely elevated.        Chloride 103 mmol/L      CO2 26.1 mmol/L      Calcium 9.5 mg/dL      Total Protein 7.1 g/dL       Albumin 4.3 g/dL      ALT (SGPT) 20 U/L      AST (SGOT) 24 U/L      Comment: Slight hemolysis detected by analyzer. Result may be falsely elevated.        Alkaline Phosphatase 50 U/L      Total Bilirubin 0.7 mg/dL      Globulin 2.8 gm/dL      A/G Ratio 1.5 g/dL      BUN/Creatinine Ratio 12.5     Anion Gap 8.9 mmol/L      eGFR 100.3 mL/min/1.73     Narrative:      GFR Normal >60  Chronic Kidney Disease <60  Kidney Failure <15      Magnesium [[id number]]  (Normal) Collected: 09/12/24 0547    Specimen: Blood from Arm, Right Updated: 09/12/24 0634     Magnesium 1.8 mg/dL     Phosphorus [[id number]]  (Normal) Collected: 09/12/24 0547    Specimen: Blood from Arm, Right Updated: 09/12/24 0628     Phosphorus 2.9 mg/dL     aPTT [[id number]]  (Normal) Collected: 09/12/24 0547    Specimen: Blood from Arm, Right Updated: 09/12/24 0608     PTT 28.9 seconds     Protime-INR [[id number]]  (Normal) Collected: 09/12/24 0547    Specimen: Blood from Arm, Right Updated: 09/12/24 0608     Protime 11.4 Seconds      INR 1.05    CBC Auto Differential [[id number]]  (Abnormal) Collected: 09/12/24 0547    Specimen: Blood from Arm, Right Updated: 09/12/24 0559     WBC 13.24 10*3/mm3      RBC 4.87 10*6/mm3      Hemoglobin 14.2 g/dL      Hematocrit 42.6 %      MCV 87.5 fL      MCH 29.2 pg      MCHC 33.3 g/dL      RDW 12.3 %      RDW-SD 39.6 fl      MPV 9.8 fL      Platelets 208 10*3/mm3      Neutrophil % 85.8 %      Lymphocyte % 7.9 %      Monocyte % 5.4 %      Eosinophil % 0.2 %      Basophil % 0.2 %      Immature Grans % 0.5 %      Neutrophils, Absolute 11.37 10*3/mm3      Lymphocytes, Absolute 1.04 10*3/mm3      Monocytes, Absolute 0.72 10*3/mm3      Eosinophils, Absolute 0.03 10*3/mm3      Basophils, Absolute 0.02 10*3/mm3      Immature Grans, Absolute 0.06 10*3/mm3      nRBC 0.0 /100 WBC     Lactic Acid, Plasma [[id number]]  (Normal) Collected: 09/12/24 0102    Specimen: Blood Updated: 09/12/24 0125     Lactate 0.9 mmol/L     Comprehensive  Metabolic Panel [[id number]]  (Abnormal) Collected: 09/12/24 0055    Specimen: Blood Updated: 09/12/24 0118     Glucose 98 mg/dL      BUN 14 mg/dL      Creatinine 0.97 mg/dL      Sodium 138 mmol/L      Potassium 3.7 mmol/L      Chloride 99 mmol/L      CO2 27.3 mmol/L      Calcium 10.6 mg/dL      Total Protein 7.6 g/dL      Albumin 4.8 g/dL      ALT (SGPT) 20 U/L      AST (SGOT) 18 U/L      Alkaline Phosphatase 54 U/L      Total Bilirubin 0.5 mg/dL      Globulin 2.8 gm/dL      A/G Ratio 1.7 g/dL      BUN/Creatinine Ratio 14.4     Anion Gap 11.7 mmol/L      eGFR 109.0 mL/min/1.73     Narrative:      GFR Normal >60  Chronic Kidney Disease <60  Kidney Failure <15      CBC & Differential [[id number]]  (Abnormal) Collected: 09/12/24 0055    Specimen: Blood Updated: 09/12/24 0058    Narrative:      The following orders were created for panel order CBC & Differential.  Procedure                               Abnormality         Status                     ---------                               -----------         ------                     CBC Auto Differential[[id number]]        Abnormal            Final result                 Please view results for these tests on the individual orders.    CBC Auto Differential [[id number]]  (Abnormal) Collected: 09/12/24 0055    Specimen: Blood Updated: 09/12/24 0058     WBC 12.60 10*3/mm3      RBC 5.38 10*6/mm3      Hemoglobin 15.5 g/dL      Hematocrit 47.6 %      MCV 88.5 fL      MCH 28.8 pg      MCHC 32.6 g/dL      RDW 12.3 %      RDW-SD 40.6 fl      MPV 9.6 fL      Platelets 223 10*3/mm3      Neutrophil % 65.4 %      Lymphocyte % 25.6 %      Monocyte % 7.4 %      Eosinophil % 1.3 %      Basophil % 0.2 %      Immature Grans % 0.1 %      Neutrophils, Absolute 8.25 10*3/mm3      Lymphocytes, Absolute 3.22 10*3/mm3      Monocytes, Absolute 0.93 10*3/mm3      Eosinophils, Absolute 0.16 10*3/mm3      Basophils, Absolute 0.03 10*3/mm3      Immature Grans, Absolute 0.01 10*3/mm3            Imaging Results (Last 72 Hours)       Procedure Component Value Units Date/Time    CT Abdomen Pelvis With Contrast [[id number]] Collected: 09/12/24 0120     Updated: 09/12/24 0127    Narrative:      CT ABDOMEN PELVIS W CONTRAST    Date of Exam: 9/12/2024 1:04 AM EDT    Indication: RLQ pain.    Comparison: None available.    Technique: Axial CT images were obtained of the abdomen and pelvis following the uneventful intravenous administration of iodinated contrast. Sagittal and coronal reconstructions were performed.  Automated exposure control and iterative reconstruction   methods were used.        Findings:  The liver appears normal in size without focal abnormality. Spleen is normal size and appears homogeneous.  Stomach is nondistended.  No adrenal mass.  Kidneys appear unremarkable.  No hydronephrosis.  Urinary bladder is within normal limits.  There is   no bowel distention.  No pneumatosis intestinalis, pneumoperitoneum or lymphadenopathy.  No significant ascites. The distal aspect of the appendix is dilated measuring up to 13 mm. The appendix is fluid-filled and contains a small appendicolith and there   is adjacent inflammatory fluid. No evidence of appendix perforation or abscess. Prostate gland is normal size. Gallbladder is nondistended.  Biliary tree is nondistended.  The pancreas appears homogeneous.  Abdominal vasculature is within normal limits.    The lung bases are clear. The heart size is normal. There is a small stomach containing hiatal hernia. Distal esophagus is unremarkable.Subcutaneous fat and underlying musculature appear within normal limits.  There are no acute osseous abnormalities or   destructive bone lesions.      Impression:      Impression:  Acute appendicitis without perforation or abscess.        Electronically Signed: Jeronimo Pollard MD    9/12/2024 1:24 AM EDT    Workstation ID: [id number]            I reviewed the patient's new clinical results.  I reviewed the patient's new  imaging results and agree with the interpretation.  I reviewed the patient's other test results and agree with the interpretation      Assessment & Plan       Appendicitis      28-year-old gentleman with acute appendicitis    He has received IV antibiotics.  I discussed his diagnosis with he and his family.  Recommendation was made for appendectomy for treatment.  They are agreeable to this.  The risks and benefits of laparoscopic appendectomy were reviewed with them.  Will plan for surgery today.    I discussed the patient's findings and my recommendations with patient and family              This document has been electronically signed by Issac Gray MD on September 12, 2024 08:40 EDT      Issac Gray MD  09/12/24  08:40 EDT

## 2024-09-12 NOTE — PROGRESS NOTES
Patient off the floor for surgery, will keep overnight for observation postoperatively and evaluate him tomorrow.

## 2024-09-12 NOTE — H&P
[facility] Medicine Services  History & Physical    Patient Name: Fernando Thompson  : 1995  MRN: [id number]  Primary Care Physician:  Provider, No Known  Date of admission: 2024  Date and Time of Service: 2024 at 0400    Subjective      Chief Complaint: RLQ abdominal pain    History of Present Illness: Fernando Thompson is a 28 y.o. male with a CMH of asthma who presented to [facility] on 2024 with a 3-day history of abdominal pain progressively gotten worse.  Right lower quadrant.  Associate with nausea and constipation.  Denies vomiting, fever, diarrhea, chest pain or shortness of breath.  ED course significant for CT scan with contrast that shows acute appendicitis without perforation or abscess.  Surgery was consulted in ED and patient will be having surgery in the morning.  Will be kept n.p.o.    Review of Systems   Constitutional:  Negative for chills.   Respiratory:  Negative for apnea, cough, shortness of breath and wheezing.    Cardiovascular:  Negative for chest pain and leg swelling.   Gastrointestinal:  Positive for abdominal pain, constipation and nausea. Negative for diarrhea and vomiting.   Genitourinary:  Negative for dysuria.   Musculoskeletal:  Negative for arthralgias and back pain.   Neurological:  Negative for dizziness, seizures and numbness.   Psychiatric/Behavioral:  Negative for agitation.        Personal History     History reviewed. No pertinent past medical history.    History reviewed. No pertinent surgical history.    Family History: family history is not on file. Otherwise pertinent FHx was reviewed and not pertinent to current issue.    Social History:  reports that he has never smoked. He has never been exposed to tobacco smoke. He has never used smokeless tobacco. He reports that he does not drink alcohol and does not use drugs.    Home Medications:  Prior to Admission Medications       Prescriptions Last Dose Informant Patient Reported?  Taking?    cetirizine (zyrTEC) 10 MG tablet Past Week  Yes Yes    Take 1 tablet by mouth Daily.    ibuprofen (ADVIL,MOTRIN) 600 MG tablet 9/11/2024  No Yes    Take 1 tablet by mouth Every 6 (Six) Hours As Needed for Moderate Pain.    montelukast (SINGULAIR) 10 MG tablet 9/11/2024  Yes Yes    Take 1 tablet by mouth Every Night.    Tezepelumab-ekko (Tezspire) 210 MG/1.91ML injection More than a month  Yes No    Inject 1.91 mL under the skin into the appropriate area as directed Every 28 (Twenty-Eight) Days.              Allergies:  No Known Allergies    Objective      Vitals:   Temp:  [98 °F (36.7 °C)-98.6 °F (37 °C)] 98.1 °F (36.7 °C)  Heart Rate:  [] 72  Resp:  [11-18] 17  BP: (103-146)/(46-96) 113/68  Body mass index is 23.66 kg/m².  Physical Exam  Constitutional:       Appearance: Normal appearance.   HENT:      Head: Normocephalic.      Right Ear: Tympanic membrane normal.      Left Ear: Tympanic membrane normal.      Mouth/Throat:      Mouth: Mucous membranes are moist.   Eyes:      Pupils: Pupils are equal, round, and reactive to light.   Cardiovascular:      Rate and Rhythm: Normal rate and regular rhythm.      Heart sounds: No murmur heard.  Pulmonary:      Effort: No respiratory distress.      Breath sounds: No wheezing.   Abdominal:      General: There is distension.      Palpations: There is no mass.   Musculoskeletal:         General: Normal range of motion.   Skin:     General: Skin is warm and dry.      Capillary Refill: Capillary refill takes less than 2 seconds.   Neurological:      General: No focal deficit present.      Mental Status: He is alert and oriented to person, place, and time.         Diagnostic Data:  Lab Results (last 24 hours)       Procedure Component Value Units Date/Time    Lactic Acid, Plasma [[id number]]  (Normal) Collected: 09/12/24 0102    Specimen: Blood Updated: 09/12/24 0125     Lactate 0.9 mmol/L     Comprehensive Metabolic Panel [[id number]]  (Abnormal) Collected:  09/12/24 0055    Specimen: Blood Updated: 09/12/24 0118     Glucose 98 mg/dL      BUN 14 mg/dL      Creatinine 0.97 mg/dL      Sodium 138 mmol/L      Potassium 3.7 mmol/L      Chloride 99 mmol/L      CO2 27.3 mmol/L      Calcium 10.6 mg/dL      Total Protein 7.6 g/dL      Albumin 4.8 g/dL      ALT (SGPT) 20 U/L      AST (SGOT) 18 U/L      Alkaline Phosphatase 54 U/L      Total Bilirubin 0.5 mg/dL      Globulin 2.8 gm/dL      A/G Ratio 1.7 g/dL      BUN/Creatinine Ratio 14.4     Anion Gap 11.7 mmol/L      eGFR 109.0 mL/min/1.73     Narrative:      GFR Normal >60  Chronic Kidney Disease <60  Kidney Failure <15      CBC & Differential [[id number]]  (Abnormal) Collected: 09/12/24 0055    Specimen: Blood Updated: 09/12/24 0058    Narrative:      The following orders were created for panel order CBC & Differential.  Procedure                               Abnormality         Status                     ---------                               -----------         ------                     CBC Auto Differential[[id number]]        Abnormal            Final result                 Please view results for these tests on the individual orders.    CBC Auto Differential [[id number]]  (Abnormal) Collected: 09/12/24 0055    Specimen: Blood Updated: 09/12/24 0058     WBC 12.60 10*3/mm3      RBC 5.38 10*6/mm3      Hemoglobin 15.5 g/dL      Hematocrit 47.6 %      MCV 88.5 fL      MCH 28.8 pg      MCHC 32.6 g/dL      RDW 12.3 %      RDW-SD 40.6 fl      MPV 9.6 fL      Platelets 223 10*3/mm3      Neutrophil % 65.4 %      Lymphocyte % 25.6 %      Monocyte % 7.4 %      Eosinophil % 1.3 %      Basophil % 0.2 %      Immature Grans % 0.1 %      Neutrophils, Absolute 8.25 10*3/mm3      Lymphocytes, Absolute 3.22 10*3/mm3      Monocytes, Absolute 0.93 10*3/mm3      Eosinophils, Absolute 0.16 10*3/mm3      Basophils, Absolute 0.03 10*3/mm3      Immature Grans, Absolute 0.01 10*3/mm3              Imaging Results (Last 24 Hours)       Procedure  Component Value Units Date/Time    CT Abdomen Pelvis With Contrast [[id number]] Collected: 09/12/24 0120     Updated: 09/12/24 0127    Narrative:      CT ABDOMEN PELVIS W CONTRAST    Date of Exam: 9/12/2024 1:04 AM EDT    Indication: RLQ pain.    Comparison: None available.    Technique: Axial CT images were obtained of the abdomen and pelvis following the uneventful intravenous administration of iodinated contrast. Sagittal and coronal reconstructions were performed.  Automated exposure control and iterative reconstruction   methods were used.        Findings:  The liver appears normal in size without focal abnormality. Spleen is normal size and appears homogeneous.  Stomach is nondistended.  No adrenal mass.  Kidneys appear unremarkable.  No hydronephrosis.  Urinary bladder is within normal limits.  There is   no bowel distention.  No pneumatosis intestinalis, pneumoperitoneum or lymphadenopathy.  No significant ascites. The distal aspect of the appendix is dilated measuring up to 13 mm. The appendix is fluid-filled and contains a small appendicolith and there   is adjacent inflammatory fluid. No evidence of appendix perforation or abscess. Prostate gland is normal size. Gallbladder is nondistended.  Biliary tree is nondistended.  The pancreas appears homogeneous.  Abdominal vasculature is within normal limits.    The lung bases are clear. The heart size is normal. There is a small stomach containing hiatal hernia. Distal esophagus is unremarkable.Subcutaneous fat and underlying musculature appear within normal limits.  There are no acute osseous abnormalities or   destructive bone lesions.      Impression:      Impression:  Acute appendicitis without perforation or abscess.        Electronically Signed: Jeronimo Pollard MD    9/12/2024 1:24 AM EDT    Workstation ID: [id number]              Assessment & Plan        This is a 28 y.o. male with:    Active and Resolved Problems  Active Hospital Problems    Diagnosis  POA     **Appendicitis [K37]  Yes      Resolved Hospital Problems   No resolved problems to display.     Assessment  Appendicitis  Leukocytosis likely secondary to above  Asthma  Constipation    Plan  General surgery consulted in the ED plan for appendectomy in the a.m.  N.p.o. for appendectomy  Albuterol neb treatments as needed for shortness of breath/wheezing        Full code  DVT prophylaxis with mechanical SCDs  N.p.o. for appendectomy            VTE Prophylaxis:  Mechanical VTE prophylaxis orders are present.        The patient desires to be as follows:    CODE STATUS:    Code Status (Patient has no pulse and is not breathing): CPR (Attempt to Resuscitate)  Medical Interventions (Patient has pulse or is breathing): Full Support        Susiambrose Borjas, who can be contacted at [phone], is the designated person to make medical decisions on the patient's behalf if He is incapable of doing so. This was clarified with patient and/or next of kin on 9/12/2024 during the course of this H&P.    Admission Status:  I believe this patient meets Inpatient status.    Expected Length of Stay: Greater than 1 night     PDMP and Medication Dispenses via Sidebar reviewed and consistent with patient reported medications.    I discussed the patient's findings and my recommendations with patient.      Signature:     This document has been electronically signed by Juan Francisco Roe MD on September 12, 2024 04:55 EDT   [facility] Hospitalist Team

## 2024-09-12 NOTE — PLAN OF CARE
Goal Outcome Evaluation:  Plan of Care Reviewed With: patient        Progress: no change  Outcome Evaluation: Patient admitted to floor, oriented to room, family at bedside. VSS, general surgeon called for consult, no concerns at this time.

## 2024-09-12 NOTE — CASE MANAGEMENT/SOCIAL WORK
Continued Stay Note   Danielito     Patient Name: Fernando Thompson  MRN: [id number]  Today's Date: 9/12/2024    Admit Date: 9/12/2024    Plan: Off floor for surgery   Discharge Plan       Row Name 09/12/24 1200       Plan    Plan Off floor for surgery                    Expected Discharge Date and Time       Expected Discharge Date Expected Discharge Time    Sep 13, 2024           Bernadette Faith RN    S[facility]  [email or web address]  Office [phone]  Cell [phone]

## 2024-09-12 NOTE — ANESTHESIA PROCEDURE NOTES
Airway  Date/Time: 9/12/2024 10:50 AM    General Information and Staff    Patient location during procedure: OR  CRNA/CAA: Mary Peace CRNA    Indications and Patient Condition  Indications for airway management: airway protection    Preoxygenated: yes  MILS maintained throughout  Mask difficulty assessment: 1 - vent by mask    Final Airway Details  Final airway type: endotracheal airway      Successful airway: ETT  Cuffed: yes   Successful intubation technique: video laryngoscopy  Facilitating devices/methods: intubating stylet  Blade: Quintero  Blade size: 4  ETT size (mm): 7.5  Cormack-Lehane Classification: grade I - full view of glottis  Placement verified by: chest auscultation and capnometry   Measured from: lips  ETT/EBT  to lips (cm): 23  Number of attempts at approach: 1  Assessment: lips, teeth, and gum same as pre-op and atraumatic intubation

## 2024-09-12 NOTE — ED NOTES
Report has been called to Angela Y on [facility].  Patient is traveling to [place] via private vehicle.

## 2024-09-12 NOTE — ANESTHESIA PREPROCEDURE EVALUATION
Anesthesia Evaluation     Patient summary reviewed and Nursing notes reviewed   NPO Solid Status: > 8 hours  NPO Liquid Status: > 8 hours           Airway   Mallampati: II  TM distance: >3 FB  Neck ROM: full  No difficulty expected  Dental - normal exam     Pulmonary    (+) asthma,  Cardiovascular         Neuro/Psych  GI/Hepatic/Renal/Endo      Musculoskeletal     Abdominal    Substance History      OB/GYN          Other                    Anesthesia Plan    ASA 2     general     intravenous induction     Anesthetic plan, risks, benefits, and alternatives have been provided, discussed and informed consent has been obtained with: patient.    Plan discussed with CRNA.    CODE STATUS:    Code Status (Patient has no pulse and is not breathing): CPR (Attempt to Resuscitate)  Medical Interventions (Patient has pulse or is breathing): Full Support

## 2024-09-13 VITALS
RESPIRATION RATE: 15 BRPM | TEMPERATURE: 98.2 F | WEIGHT: 164 LBS | SYSTOLIC BLOOD PRESSURE: 114 MMHG | HEART RATE: 58 BPM | HEIGHT: 70 IN | BODY MASS INDEX: 23.48 KG/M2 | DIASTOLIC BLOOD PRESSURE: 53 MMHG | OXYGEN SATURATION: 96 %

## 2024-09-13 PROBLEM — K37 APPENDICITIS: Status: RESOLVED | Noted: 2024-09-12 | Resolved: 2024-09-13

## 2024-09-13 LAB
ANION GAP SERPL CALCULATED.3IONS-SCNC: 9.6 MMOL/L (ref 5–15)
BASOPHILS # BLD AUTO: 0.01 10*3/MM3 (ref 0–0.2)
BASOPHILS NFR BLD AUTO: 0.1 % (ref 0–1.5)
BUN SERPL-MCNC: 7 MG/DL (ref 6–20)
BUN/CREAT SERPL: 9.3 (ref 7–25)
CALCIUM SPEC-SCNC: 9.2 MG/DL (ref 8.6–10.5)
CHLORIDE SERPL-SCNC: 103 MMOL/L (ref 98–107)
CO2 SERPL-SCNC: 25.4 MMOL/L (ref 22–29)
CREAT SERPL-MCNC: 0.75 MG/DL (ref 0.76–1.27)
DEPRECATED RDW RBC AUTO: 39.9 FL (ref 37–54)
EGFRCR SERPLBLD CKD-EPI 2021: 126.1 ML/MIN/1.73
EOSINOPHIL # BLD AUTO: 0 10*3/MM3 (ref 0–0.4)
EOSINOPHIL NFR BLD AUTO: 0 % (ref 0.3–6.2)
ERYTHROCYTE [DISTWIDTH] IN BLOOD BY AUTOMATED COUNT: 12.4 % (ref 12.3–15.4)
GLUCOSE SERPL-MCNC: 155 MG/DL (ref 65–99)
HCT VFR BLD AUTO: 39.4 % (ref 37.5–51)
HGB BLD-MCNC: 13.2 G/DL (ref 13–17.7)
IMM GRANULOCYTES # BLD AUTO: 0.03 10*3/MM3 (ref 0–0.05)
IMM GRANULOCYTES NFR BLD AUTO: 0.3 % (ref 0–0.5)
LYMPHOCYTES # BLD AUTO: 0.8 10*3/MM3 (ref 0.7–3.1)
LYMPHOCYTES NFR BLD AUTO: 7.3 % (ref 19.6–45.3)
MCH RBC QN AUTO: 29.3 PG (ref 26.6–33)
MCHC RBC AUTO-ENTMCNC: 33.5 G/DL (ref 31.5–35.7)
MCV RBC AUTO: 87.6 FL (ref 79–97)
MONOCYTES # BLD AUTO: 0.61 10*3/MM3 (ref 0.1–0.9)
MONOCYTES NFR BLD AUTO: 5.6 % (ref 5–12)
NEUTROPHILS NFR BLD AUTO: 86.7 % (ref 42.7–76)
NEUTROPHILS NFR BLD AUTO: 9.54 10*3/MM3 (ref 1.7–7)
NRBC BLD AUTO-RTO: 0 /100 WBC (ref 0–0.2)
PLATELET # BLD AUTO: 207 10*3/MM3 (ref 140–450)
PMV BLD AUTO: 10.1 FL (ref 6–12)
POTASSIUM SERPL-SCNC: 4.1 MMOL/L (ref 3.5–5.2)
RBC # BLD AUTO: 4.5 10*6/MM3 (ref 4.14–5.8)
SODIUM SERPL-SCNC: 138 MMOL/L (ref 136–145)
WBC NRBC COR # BLD AUTO: 10.99 10*3/MM3 (ref 3.4–10.8)

## 2024-09-13 PROCEDURE — 85025 COMPLETE CBC W/AUTO DIFF WBC: CPT | Performed by: SURGERY

## 2024-09-13 PROCEDURE — 80048 BASIC METABOLIC PNL TOTAL CA: CPT | Performed by: SURGERY

## 2024-09-13 PROCEDURE — 94799 UNLISTED PULMONARY SVC/PX: CPT

## 2024-09-13 PROCEDURE — 25010000002 CEFOXITIN PER 1 G: Performed by: SURGERY

## 2024-09-13 PROCEDURE — G0378 HOSPITAL OBSERVATION PER HR: HCPCS

## 2024-09-13 PROCEDURE — 99285 EMERGENCY DEPT VISIT HI MDM: CPT | Performed by: EMERGENCY MEDICINE

## 2024-09-13 RX ORDER — HYDROCODONE BITARTRATE AND ACETAMINOPHEN 7.5; 325 MG/1; MG/1
1 TABLET ORAL EVERY 4 HOURS PRN
Qty: 18 TABLET | Refills: 0 | Status: SHIPPED | OUTPATIENT
Start: 2024-09-13 | End: 2024-09-16

## 2024-09-13 RX ADMIN — ACETAMINOPHEN 650 MG: 325 TABLET, FILM COATED ORAL at 11:53

## 2024-09-13 RX ADMIN — CEFOXITIN SODIUM 2000 MG: 2 POWDER, FOR SOLUTION INTRAVENOUS at 00:03

## 2024-09-13 RX ADMIN — HYDROCODONE BITARTRATE AND ACETAMINOPHEN 1 TABLET: 7.5; 325 TABLET ORAL at 04:50

## 2024-09-13 RX ADMIN — CEFOXITIN SODIUM 2000 MG: 2 POWDER, FOR SOLUTION INTRAVENOUS at 04:50

## 2024-09-13 RX ADMIN — ALBUTEROL SULFATE 2.5 MG: 2.5 SOLUTION RESPIRATORY (INHALATION) at 05:28

## 2024-09-13 RX ADMIN — HYDROCODONE BITARTRATE AND ACETAMINOPHEN 1 TABLET: 7.5; 325 TABLET ORAL at 08:52

## 2024-09-13 RX ADMIN — HYDROCODONE BITARTRATE AND ACETAMINOPHEN 1 TABLET: 7.5; 325 TABLET ORAL at 00:03

## 2024-09-13 NOTE — CASE MANAGEMENT/SOCIAL WORK
Continued Stay Note  [facility]     Patient Name: Fernando Thompson  MRN: [id number]  Today's Date: 9/13/2024    Admit Date: 9/12/2024    Plan: Home. Family can transport at discharge   Discharge Plan       Row Name 09/13/24 1643       Plan    Plan Home. Family can transport at discharge    Plan Comments Discharged prior to case management seeing patient                    Expected Discharge Date and Time       Expected Discharge Date Expected Discharge Time    Sep 13, 2024           Bernadette Faith RN    SIPS 1  [email or web address]  Office [phone]  Cell [phone]

## 2024-09-13 NOTE — PLAN OF CARE
Goal Outcome Evaluation:              Outcome Evaluation: Pt has had c/o pain treated per MAR, vss, call light in reach, ambulating independently, plan of care ongoing

## 2024-09-13 NOTE — DISCHARGE INSTRUCTIONS
Call the office to schedule followup appointment approx 2 wks postop with Dr. Marina Banda to shower using soap and water.  Do not submerge incisions, no baths/soaking for 2 weeks  No lifting >10-15 lbs x 2 wks  Do not drive or take narcotics  Over the counter stool softener twice daily until off narcotics and having regular bowel movements  Milk of magnesia as needed if still constipated with stool softeners

## 2024-09-13 NOTE — PLAN OF CARE
Goal Outcome Evaluation:               VSS on room air. Pain treated per PRN PO orders. Pt to dc home today.

## 2024-09-13 NOTE — DISCHARGE SUMMARY
".             [facility] Medicine Services  Discharge Summary    Date of Service: 2024  Patient Name: Fernando Thompson  : 1995  MRN: [id number]    Date of Admission: 2024  Discharge Diagnosis: Acute appendicitis s/p laparoscopic appendectomy  Date of Discharge: 2024  Primary Care Physician: Provider, No Known      Presenting Problem:   Appendicitis [K37]  Acute appendicitis with generalized peritonitis without gangrene, perforation, or abscess [K35.200]    Active and Resolved Hospital Problems:  Active Hospital Problems   No active problems to display.      Resolved Hospital Problems    Diagnosis POA    **Appendicitis [K37] Yes         Hospital Course     HPI:    \"Fernando Thompson is a 28 y.o. male with a CMH of asthma who presented to [facility] on 2024 with a 3-day history of abdominal pain progressively gotten worse.  Right lower quadrant.  Associate with nausea and constipation.  Denies vomiting, fever, diarrhea, chest pain or shortness of breath.  ED course significant for CT scan with contrast that shows acute appendicitis without perforation or abscess.  Surgery was consulted in ED and patient will be having surgery in the morning.  \"    Hospital Course: Patient underwent laparoscopic appendectomy with general surgery, he is doing well postoperatively and tolerating diet as it is advanced.  General surgery has cleared patient for discharge.  He will be prescribed short of Norco 7.5 mg for pain and asked to follow-up with PCP.  He expresses good understanding and is happy to go home.  All questions and concerns addressed.      DISCHARGE Follow Up Recommendations for labs and diagnostics: PCP        Day of Discharge     Vital Signs:  Temp:  [97.6 °F (36.4 °C)-98.5 °F (36.9 °C)] 98.2 °F (36.8 °C)  Heart Rate:  [] 58  Resp:  [13-18] 15  BP: (100-140)/(48-76) 114/53    Physical Exam:  Physical Exam   Constitutional:       Appearance: Normal appearance.   HENT:      " Head: Normocephalic.      Right Ear: Tympanic membrane normal.      Left Ear: Tympanic membrane normal.      Mouth/Throat:      Mouth: Mucous membranes are moist.   Eyes:      Pupils: Pupils are equal, round, and reactive to light.   Cardiovascular:      Rate and Rhythm: Normal rate and regular rhythm.      Heart sounds: No murmur heard.  Pulmonary:      Effort: No respiratory distress.      Breath sounds: No wheezing.   Abdominal:      General: Soft, laparoscopic entry sites noted with minimal surrounding erythema and no drainage.     Palpations: There is no mass.   Musculoskeletal:         General: Normal range of motion.   Skin:     General: Skin is warm and dry.      Capillary Refill: Capillary refill takes less than 2 seconds.   Neurological:      General: No focal deficit present.      Mental Status: He is alert and oriented to person, place, and time.      Pertinent  and/or Most Recent Results     LAB RESULTS:      Lab 09/13/24  0009 09/12/24 0547 09/12/24 0102 09/12/24  0055   WBC 10.99* 13.24*  --  12.60*   HEMOGLOBIN 13.2 14.2  --  15.5   HEMATOCRIT 39.4 42.6  --  47.6   PLATELETS 207 208  --  223   NEUTROS ABS 9.54* 11.37*  --  8.25*   IMMATURE GRANS (ABS) 0.03 0.06*  --  0.01   LYMPHS ABS 0.80 1.04  --  3.22*   MONOS ABS 0.61 0.72  --  0.93*   EOS ABS 0.00 0.03  --  0.16   MCV 87.6 87.5  --  88.5   LACTATE  --   --  0.9  --    PROTIME  --  11.4  --   --    APTT  --  28.9  --   --          Lab 09/13/24  0009 09/12/24  0547 09/12/24  0055   SODIUM 138 138 138   POTASSIUM 4.1 4.3 3.7   CHLORIDE 103 103 99   CO2 25.4 26.1 27.3   ANION GAP 9.6 8.9 11.7   BUN 7 13 14   CREATININE 0.75* 1.04 0.97   EGFR 126.1 100.3 109.0   GLUCOSE 155* 113* 98   CALCIUM 9.2 9.5 10.6*   MAGNESIUM  --  1.8  --    PHOSPHORUS  --  2.9  --          Lab 09/12/24  0547 09/12/24  0055   TOTAL PROTEIN 7.1 7.6   ALBUMIN 4.3 4.8   GLOBULIN 2.8 2.8   ALT (SGPT) 20 20   AST (SGOT) 24 18   BILIRUBIN 0.7 0.5   ALK PHOS 50 54         Lab  09/12/24  0547   PROTIME 11.4   INR 1.05             Lab 09/12/24  0547   ABO TYPING A   RH TYPING Positive   ANTIBODY SCREEN Negative         Brief Urine Lab Results       None          Microbiology Results (last 10 days)       ** No results found for the last 240 hours. **            XR Chest 1 View    Result Date: 9/12/2024  Impression: Impression: No acute cardiopulmonary finding. Electronically Signed: Debra Shane MD  9/12/2024 9:00 AM EDT  Workstation ID: [id number]    CT Abdomen Pelvis With Contrast    Result Date: 9/12/2024  Impression: Impression: Acute appendicitis without perforation or abscess. Electronically Signed: Jeronimo Pollard MD  9/12/2024 1:24 AM EDT  Workstation ID: [id number]                 Labs Pending at Discharge:  Pending Labs       Order Current Status    Tissue Pathology Exam In process            Procedures Performed  Procedure(s):  APPENDECTOMY LAPAROSCOPIC         Consults:   Consults       Date and Time Order Name Status Description    9/12/2024  4:54 AM Inpatient General Surgery Consult Completed               Discharge Details        Discharge Medications        New Medications        Instructions Start Date   HYDROcodone-acetaminophen 7.5-325 MG per tablet  Commonly known as: NORCO   1 tablet, Oral, Every 4 Hours PRN             Continue These Medications        Instructions Start Date   cetirizine 10 MG tablet  Commonly known as: zyrTEC   10 mg, Oral, Daily      ibuprofen 600 MG tablet  Commonly known as: ADVIL,MOTRIN   600 mg, Oral, Every 6 Hours PRN      montelukast 10 MG tablet  Commonly known as: SINGULAIR   10 mg, Oral, Nightly      Tezspire 210 MG/1.91ML injection  Generic drug: Tezepelumab-ekko   210 mg, Subcutaneous, Every 28 Days               No Known Allergies      Discharge Disposition: Home routine  Home or Self Care    Diet:  Hospital:  Diet Order   Procedures    Diet: Regular/House; Fluid Consistency: Thin (IDDSI 0)         Discharge Activity:         CODE  STATUS:  Code Status and Medical Interventions: CPR (Attempt to Resuscitate); Full Support   Ordered at: 09/12/24 0454     Code Status (Patient has no pulse and is not breathing):    CPR (Attempt to Resuscitate)     Medical Interventions (Patient has pulse or is breathing):    Full Support         No future appointments.        Time spent on Discharge including face to face service:  30  minutes    Signature: Electronically signed by Dick Knight MD, 09/13/24, 11:27 EDT.  [facility] Hospitalist Team

## 2024-09-13 NOTE — CASE MANAGEMENT/SOCIAL WORK
Case Management Discharge Note      Final Note: HOME         Selected Continued Care - Discharged on 9/13/2024 Admission date: 9/12/2024 - Discharge disposition: Home or Self Care            Transportation Services  Private: Car    Final Discharge Disposition Code: 01 - home or self-care

## 2024-09-16 LAB
LAB AP CASE REPORT: NORMAL
PATH REPORT.FINAL DX SPEC: NORMAL
PATH REPORT.GROSS SPEC: NORMAL

## 2024-09-17 ENCOUNTER — TELEPHONE (OUTPATIENT)
Dept: SURGERY | Facility: CLINIC | Age: 29
End: 2024-09-17
Payer: MEDICAID

## 2024-09-18 ENCOUNTER — TELEPHONE (OUTPATIENT)
Dept: SURGERY | Facility: CLINIC | Age: 29
End: 2024-09-18
Payer: MEDICAID

## 2024-09-25 ENCOUNTER — HOSPITAL ENCOUNTER (EMERGENCY)
Facility: HOSPITAL | Age: 29
Discharge: HOME | End: 2024-09-25
Attending: EMERGENCY MEDICINE | Admitting: EMERGENCY MEDICINE
Payer: MEDICAID

## 2024-09-25 VITALS
HEIGHT: 70 IN | SYSTOLIC BLOOD PRESSURE: 155 MMHG | BODY MASS INDEX: 23.48 KG/M2 | DIASTOLIC BLOOD PRESSURE: 89 MMHG | HEART RATE: 71 BPM | TEMPERATURE: 97.6 F | RESPIRATION RATE: 18 BRPM | WEIGHT: 164.02 LBS | OXYGEN SATURATION: 97 %

## 2024-09-25 DIAGNOSIS — R10.84 GENERALIZED ABDOMINAL PAIN: Primary | ICD-10-CM

## 2024-09-25 DIAGNOSIS — K59.00 CONSTIPATION, UNSPECIFIED CONSTIPATION TYPE: ICD-10-CM

## 2024-09-25 PROCEDURE — 99282 EMERGENCY DEPT VISIT SF MDM: CPT

## 2024-09-25 PROCEDURE — 99282 EMERGENCY DEPT VISIT SF MDM: CPT | Performed by: EMERGENCY MEDICINE

## 2024-09-25 NOTE — FSED PROVIDER NOTE
Subjective   History of Present Illness  Patient is a 28-year-old male who is 2 weeks status post appendectomy, presents emergency room with complaints of generalized abdominal pain.  Patient states that he has not had a bowel movement since his surgery 2 weeks ago.  Patient states that he tried to take some Dulcolax last night before going to bed and then woke up during the middle of the night with severe abdominal cramping and pain.  He reports that his pain was diffuse.  Does not localize.  He feels the need that he needs to have a bowel movement.  No vomiting.        Review of Systems   Constitutional: Negative.  Negative for chills, fatigue and fever.   Eyes: Negative.    Respiratory:  Negative for cough, chest tightness and shortness of breath.    Cardiovascular:  Negative for chest pain and palpitations.   Gastrointestinal:  Positive for abdominal pain and constipation. Negative for diarrhea, nausea and vomiting.   Genitourinary: Negative.    Musculoskeletal: Negative.    Skin: Negative.  Negative for rash.   Neurological: Negative.  Negative for syncope, weakness, numbness and headaches.   Psychiatric/Behavioral: Negative.     All other systems reviewed and are negative.      Past Medical History:   Diagnosis Date    Asthma        No Known Allergies    Past Surgical History:   Procedure Laterality Date    APPENDECTOMY N/A 9/12/2024    Procedure: APPENDECTOMY LAPAROSCOPIC;  Surgeon: Issac Gray MD;  Location: [facility] MAIN OR;  Service: General;  Laterality: N/A;       History reviewed. No pertinent family history.    Social History     Socioeconomic History    Marital status: Single   Tobacco Use    Smoking status: Never     Passive exposure: Never    Smokeless tobacco: Never   Vaping Use    Vaping status: Never Used   Substance and Sexual Activity    Alcohol use: Never    Drug use: Never    Sexual activity: Defer           Objective   Physical Exam  Vitals and nursing note reviewed.    Constitutional:       General: He is not in acute distress.     Appearance: Normal appearance. He is normal weight.   HENT:      Head: Normocephalic and atraumatic.      Right Ear: External ear normal.      Left Ear: External ear normal.      Nose: Nose normal.      Mouth/Throat:      Mouth: Mucous membranes are moist.   Eyes:      Extraocular Movements: Extraocular movements intact.      Conjunctiva/sclera: Conjunctivae normal.      Pupils: Pupils are equal, round, and reactive to light.   Cardiovascular:      Rate and Rhythm: Normal rate and regular rhythm.      Pulses: Normal pulses.      Heart sounds: Normal heart sounds. No murmur heard.     No friction rub. No gallop.   Pulmonary:      Effort: Pulmonary effort is normal. No respiratory distress.      Breath sounds: Normal breath sounds.   Abdominal:      General: Abdomen is flat. There is no distension.      Tenderness: generalized  There is no guarding or rebound.   Musculoskeletal:         General: No deformity or signs of injury. Normal range of motion.      Cervical back: Normal range of motion and neck supple. No tenderness.   Skin:     General: Skin is warm.      Capillary Refill: Capillary refill takes less than 2 seconds.   Neurological:      General: No focal deficit present.      Mental Status: He is alert and oriented to person, place, and time. Mental status is at baseline.   Psychiatric:         Mood and Affect: Mood normal.         Procedures           ED Course                                           Medical Decision Making  Patient presents with abdominal pain after appendectomy.  His pain only started after he took Dulcolax.  Pain from abdominal cramping and constipation is most likely diagnosis.  Cannot exclude any other abdominal condition.  During about the first 40 minutes of the ER visit, he spent it in the bathroom trying to have a bowel movement.  Patient states that he was able to get a little bit of stool out and feels a little  bit better.  He is discharged home.  He has follow-up with his surgeon tomorrow.    Problems Addressed:  Constipation, unspecified constipation type: acute illness or injury  Generalized abdominal pain: acute illness or injury        Final diagnoses:   Generalized abdominal pain   Constipation, unspecified constipation type       ED Disposition  ED Disposition       ED Disposition   Discharge    Condition   Stable    Comment   --               Issac Gray MD  [address] [place] [address] [place] [facility]  [phone]    In 1 day  As scheduled         Medication List      No changes were made to your prescriptions during this visit.

## 2024-09-25 NOTE — Clinical Note
[place] [id number] [place] [address] [place] [address]  Phone: [phone]    Fernando Thompson was seen and treated in our emergency department on 9/25/2024.  He may return to work on 09/26/2024.         Thank you for choosing [facility].    Otilio Delcid MD

## 2024-09-26 NOTE — FSED PROVIDER NOTE
Subjective   History of Present Illness    28-year-old male presents emergency department with abdominal pain is here for the last 2 days.  It started in his umbilicus and traveled to the right lower quadrant.  Has had decreased appetite and increasing pain.  He was unable to eat really anything today.  Took some over-the-counter pain medicine with minimal relief.  He is coming in today because he was concerned that the pain which is not getting better and it feels out of the ordinary for his usual stomachache.    Review of Systems    All systems negative except as otherwise mentioned in the HPI        Past Medical History:   Diagnosis Date    Asthma        No Known Allergies    Past Surgical History:   Procedure Laterality Date    APPENDECTOMY N/A 9/12/2024    Procedure: APPENDECTOMY LAPAROSCOPIC;  Surgeon: Issac Gray MD;  Location: [facility] MAIN OR;  Service: General;  Laterality: N/A;       History reviewed. No pertinent family history.    Social History     Socioeconomic History    Marital status: Single   Tobacco Use    Smoking status: Never     Passive exposure: Never    Smokeless tobacco: Never   Vaping Use    Vaping status: Never Used   Substance and Sexual Activity    Alcohol use: Never    Drug use: Never    Sexual activity: Defer           Objective   Physical Exam    General: Alert and oriented, conversant  Eye: PERRL, EOMI, nomal conjunctiva  HENT: Normocephalic, normal hearing, moist oral mucosa    Lungs: Nonlabored respiration, no wheezing  Heart: Normal Rate, no mumurs gallops or rubs  Abdomen: Right lower quadrant tenderness.  No rebound rigidity or guarding.  Abdomen is soft nonrigid           Musculoskeletal: Normal range of motion and strength, no tenderness or swelling  Skin: Warm and dry, no alarming rashes  Neurologic: Awake, responsive, moving all extremities, no focal deficits  Psychiatric:  Cooperative, appropriate mood and affect          Procedures           ED Course  ED  Course as of 09/26/24 0625   u Sep 12, 2024   0308 Surgeon, Dr Crow []      ED Course User Index  [] Fernando Noonan MD                                           Medical Decision Making  Problems Addressed:  Acute appendicitis with generalized peritonitis without gangrene, perforation, or abscess: complicated acute illness or injury    Amount and/or Complexity of Data Reviewed  Labs: ordered.  Radiology: ordered.    Risk  Prescription drug management.  Decision regarding hospitalization.    28-year-old male presents emergency department with right lower quadrant pain.  He does have normal vitals but he does appear to be in significant pain.  His exam is consistent with concern for appendicitis.    CT of the abdomen demonstrates appendicitis.  The patient was called to surgery they will take patient under admission to medicine and they will follow along and get him for his appendix out tomorrow.  Antibiotics were given in the emergency department.  No signs of sepsis.  Blood pressure is maintained and with fentanyl his pain is better controlled.  Initially morphine did not work for his pain.    Patient will be transferred to [facility] inpatient service for appendectomy    Final diagnoses:   Acute appendicitis with generalized peritonitis without gangrene, perforation, or abscess       ED Disposition  ED Disposition       ED Disposition   Decision to Admit    Condition   --    Comment   Level of Care: Med/Surg [1]   Diagnosis: Appendicitis [884737]   Admitting Physician: ROMAINE OLSEN [[id number]]   Certification: I Certify That Inpatient Hospital Services Are Medically Necessary For Greater Than 2 Midnights                 Provider,  Known  [facility] [place] [address]          Issac Gray MD  [address] [place] [address] [place] [address]  [phone]    Follow up on 9/26/2024  09:15am         Medication List        ASK your doctor about these medications       HYDROcodone-acetaminophen 7.5-325 MG per tablet  Commonly known as: NORCO  Take 1 tablet by mouth Every 4 (Four) Hours As Needed for Moderate Pain for up to 3 days.  Ask about: Should I take this medication?               Where to Get Your Medications        [id number] [place] [facility] [address] AT [address] [place] - [phone] [facility] [phone]   [address] [facility] [facility] [place] [address]      Phone: [phone]   HYDROcodone-acetaminophen 7.5-325 MG per tablet

## 2024-09-27 ENCOUNTER — TELEPHONE (OUTPATIENT)
Dept: SURGERY | Facility: CLINIC | Age: 29
End: 2024-09-27
Payer: MEDICAID

## 2025-01-30 ENCOUNTER — TRANSCRIBE ORDERS (OUTPATIENT)
Dept: SPEECH THERAPY | Facility: HOSPITAL | Age: 30
End: 2025-01-30
Payer: COMMERCIAL

## 2025-01-30 DIAGNOSIS — J38.3 VOCAL CORD DYSFUNCTION: Primary | ICD-10-CM

## 2025-02-14 ENCOUNTER — TRANSCRIBE ORDERS (OUTPATIENT)
Dept: SPEECH THERAPY | Facility: HOSPITAL | Age: 30
End: 2025-02-14
Payer: COMMERCIAL

## 2025-02-14 DIAGNOSIS — J38.3 VOCAL CORD DYSFUNCTION: Primary | ICD-10-CM

## 2025-03-05 ENCOUNTER — HOSPITAL ENCOUNTER (OUTPATIENT)
Dept: SPEECH THERAPY | Facility: HOSPITAL | Age: 30
Discharge: HOME | End: 2025-03-05
Admitting: OTOLARYNGOLOGY
Payer: COMMERCIAL

## 2025-03-05 ENCOUNTER — HOSPITAL ENCOUNTER (OUTPATIENT)
Dept: SPEECH THERAPY | Facility: HOSPITAL | Age: 30
Discharge: HOME | End: 2025-03-05
Payer: COMMERCIAL

## 2025-03-05 DIAGNOSIS — J38.3 VOCAL CORD DYSFUNCTION: ICD-10-CM

## 2025-03-05 PROCEDURE — 31579 LARYNGOSCOPY TELESCOPIC: CPT

## 2025-03-05 PROCEDURE — 92524 BEHAVRAL QUALIT ANALYS VOICE: CPT

## 2025-03-05 NOTE — THERAPY EVALUATION
Outpatient Speech Language Pathology   Adult/Peds Voice Initial Evaluation  [facility]     Patient Name: Fernando Thompson  : 1995  MRN: [id number]  Today's Date: 3/5/2025         Visit Date: 2025   Patient Active Problem List   Diagnosis   (none) - all problems resolved or deleted        Past Medical History:   Diagnosis Date    Asthma         Past Surgical History:   Procedure Laterality Date    APPENDECTOMY N/A 2024    Procedure: APPENDECTOMY LAPAROSCOPIC;  Surgeon: Issac Gray MD;  Location: [facility] OR;  Service: General;  Laterality: N/A;         Visit Dx:  Patient Info    Name: Fernando Thompson   Gender: male     YOB: 1995   Referring Physician: Dr. Nestor Richards   Original Complaint: Difficulty breathing   Patient Primary Diagnosis: J 38.3 Other diseases of the vocal cords  K 21.9 Gastro-esophageal eflux disease without esophagitis.      Patient's Social Hx:  Social History     Social History Narrative    Not on file        Patient's Medical Hx:  Past Medical History:   Diagnosis Date    Asthma        PATIENT MEDICAL HISTORY:     Fernando Thompson is a 29 y.o. male who presents to [facility] this date for a Voice Evaluation and Video Stroboscopy Evaluation by ENT  Dr. Nestor Richards for possible vocal cord dysfunction. The patient is being seen by Dr. Richards due to difficulty breathing. He reports a history of asthma for approximately 1 year and neck inflammation causing difficulty breathing and speaking. It was reported that he has seen a Pulmonologist for care for approximately 5-6 months and was trialed with biologics, Prednisone, Singulair, Breo Inhalers and Allergy Shots without improvements. Diagnostic workup has also included X-ray, blood tests, and CT scan. All have been unremarkable with normal eosinophil levels. Allergy shots were initiated in  however reported no alleviation of his neck symptoms. He denies a history of reflux or  "indigestion, reporting only occasional minor symptoms. He underwent a flexible fiberoptic examination by Dr. Richards on 2025 with the following noted: \"Vocal cords look normal. I do not see any nodules, tumors or lesions. The movement is intact. The airway is widely patent. Nasopharynx, hypopharynx and supraglottis are normal. No lesions within the pyriform sinuses. No abnormalities seen\". It was suspected that he may have reflux and possible vocal cord dysfunction, and was referred to this department today for further work up of his complaints.       VOCAL HYGIENE:    The patient reports daily vocal usage for ADL is average. Daily liquid consumption is comprised of the followin bottles of water, 1 bottle of Gatorade, coffee (1 \"blondie\" a day) and one Cesar. He does report he does not feel well  hydrated, and does indicate and increase in his symptoms and possible reflux when he doesn't \"eat clean\". He does report a decrease in symptoms when he drinks more water, avoids caffeine and \"eats clean\" such as more fruits and less fast food. He complains of more difficulty with exhalation and the feeling of being unable to get a full breath. 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 Tension/tightness reported in the neck region and little to none in the chest. He reports a sensation of a \"knot\" in his throat and points to the right side of his neck. Symptoms do come and go quickly. He is able to state some of the following as triggers: allergies to cats/animals, being in carpeted areas and situations that are tense or cause anxiety. The patient stated he spoke to his brother who gave him some breathing exercises, and feels as if these have helped him. The patient endorses some of the below listed which may contribute to possible vocally abusive behaviors/instances:     Throat clearing    The patient reports taking the following medications:     Inhaler      The following " medications and/or drug class were noted to have an impact on voice when cross referenced with the [employer] medication database:    Inhaler         Pharyngitis has been reported frequently during duloxetine administration (9% vs. 5% placebo). In clinical trials, cough was reported in 3--6% of those on duloxetine versus 3% of those receiving placebo.        Dry mouth may occur. The use of sedatives may produce an uninhibited or diminished drive to speak. Symptoms of dysarthria (slow, slurred and uncoordinated speech movements) may also be linked to sedative use.    SSRI's may have a drying effect on mucous membranes that may cause hoarseness, sore throat, voice changes or laryngitis. In addition to irritation, dry vocal folds may be more proven to injuries, such as nodules.    Anticonvulsants may produce an uninhibited or diminished drive to speak. Symptoms of dysarthria (slow, slurred and uncoordinated speech movements) may also be linked to their use.    Narcotics may produce an uninhibited or diminished drive to speak. Symptoms of dysarthria (slow, slurred and uncoordinated speech movements) may also be linked to narcotic use.    Calcium channel blockers such as amolodipine should be used cautiously in patients with gastroesophageal reflux disease (GERD) or hiatal hernia associated with reflux esophagitis. The drugs relax the lower esophageal sphincter. Note that voice production can be adversely affected by GERD. Excessive coughing has been associated with the use of ACE inhibitors, which in turn, may lead to hoarseness and possibly vocal tissue damage.          REFLUX SYMPTOM INDEX:    Pt completed the Reflux Symptom Index (RSI). RSI is self rated on a scale of 0-5 (0= no problem and 5= severe problem)    Results as follows:    Hoarseness of a problem with your voice: 2  Clearing your throat: 3  Excess throat mucus or postnasal drip 2  Difficulty swallowing food, liquids, or pills  0  Coughing after you are or after lying down 0  Breathing difficulties or choking episodes 4  Troublesome or annoying cough 0  Sensations of something sticking in your throat 0  Heartburn, chest pain, indigestion, or stomach acid coming up 4      Total: 15    Normative data suggests that an RSI total of greater than or equal to 13 is clinically significant and may indicate uncontrolled reflux disease. (Normative date suggests that a RSI of greater than or equal to 13 is clinically significant. Therefore a RSI > 13 may be indicative of significant reflux disease.)      EXAMINATION NOTES:    The patient was positioned up at a 90 degree angle. The rigid scope was introduced into the oral cavity. The vocal folds were able to be visualized upon phonation of vowel /i/. The vocal folds were pearly/white in appearance. Edges were smooth with no areas of lesions or concern noted. Glottic closure was complete and adequate. Vertical length of approximation was equal and symmetric bilaterally. No hyperfunction was noted at any time. Arytenoid movement was observed to be equal and symmetric with no significant difficulties evident. Vibratory behavior of the vocal folds was appropriate. Good mucosal wave action present. Mild to moderate mucus banding noted with again, the patient reporting frequent need to throat clear. Slight adduction of the vocal folds noted during quiet and forced inspiration/exhalation, which could correlate to his diagnosis of possible vocal cord dysfunction.        Phonation            Mucus banding      Vocal fold abduction      IMPRESSIONS:     The patient presents with a mild to moderate voice disorder characterized decreased inhalation/exhalation during periods of stress/tension as well as with some known triggers, which appears to correlate to his diagnosis of Vocal Cord Dysfunction. Teaching, counseling and written information was provided with extensive education regarding care/use of voice, as  well as Relaxed Throat Breathing exercises with rationale regarding exhalation on sustained fricative (including /s/, /f/ or /sh/ phonemes). The patient expressed understanding and was in agreement with all of the information provided. He is to practice the Relaxed Throat Breathing Exercises provided and is to perform at rest, with gentle activity, and before he works tomorrow. He is to continue exercises at rest and prior to any known triggers. At the first sign of distress he was encouraged to stop his activity and initiate relaxed throat breathing exercises until this episode subsides. He was very appreciate of the information provided and eager to work on this. He was encouraged to reduced frequent throat clearing by taking a sip of water (with rationale explained as to the impact of throat clearing on the vocal mechanism), to increase hydration/water intake, and given reflux precautions due to the score on the Reflux Symptom Index. We are available for follow up therapy/treatment to re-enforce use of diaphragmatic breathing, relaxed throat breathing, throat clearing reduction program, and for vocal mechanism care/hydration. We are available for follow up therapy/intervention if indicated after he is able to initiate the home program and if he has any questions following.     Thank you for referring this pleasant patient to our department and allowing us to participate in his care.       LONG TERM GOAL:     1-2 sessions a month for 3 months     The patient will return breathing to useful function for all daily needs, with independent use of compensations provided in therapy, independent use of Relaxed Throat Breathing and increase care/hydration of the vocal mechanis.         SHORT TERM GOALS:     1 x a week for every 1-2 weeks.      The patient will participate in the establishment and carryover of a home program to address reduction of phonotraumatic behaviors, be able to identify and eliminate these behaviors  and develop and implement a vocal hygiene program.     2.    The patient will use diaphragmatic breathing during focused activities with 90% accuracy     3.     The patient will use Relaxed Throat Breathing during sustained/focused activities at least 5 times a day exhaling on fricative /s/ with minimal cues and 90% accuracy     4.     The patient will increase water intake to a minimum of 3 (16 ounce) bottles a day     5.     The patient will participate in a throat clearing reduction program and carryover at home     6.     Patient/caregiver teaching with additional goals/recommendations to follow        03/05/25         Time Calculation:          Audelia Parrish, SLP  3/5/2025

## (undated) DEVICE — ENDOPATH ETS-FLEX45 ARTICULATING ENDOSCOPIC LINEAR CUTTER, NO RELOAD: Brand: ENDOPATH

## (undated) DEVICE — ENDOPATH XCEL WITH OPTIVIEW TECHNOLOGY BLADELESS TROCARS WITH STABILITY SLEEVES: Brand: ENDOPATH XCEL OPTIVIEW

## (undated) DEVICE — ADHS SKIN PREMIERPRO EXOFIN TOPICAL HI/VISC .5ML

## (undated) DEVICE — GENERAL LAPAROSCOPY CDS: Brand: MEDLINE INDUSTRIES, INC.

## (undated) DEVICE — ENDOPATH XCEL BLADELESS TROCARS WITH STABILITY SLEEVES: Brand: ENDOPATH XCEL

## (undated) DEVICE — SOL NACL 0.9PCT 1000ML

## (undated) DEVICE — ENDOPATH 5MM CURVED SCISSORS WITH MONOPOLAR CAUTERY: Brand: ENDOPATH

## (undated) DEVICE — GLV SURG BIOGEL LTX PF 7 1/2

## (undated) DEVICE — PENCL HND ROCKRSWTCH HOLSTR EZ CLEAN TP CRD 10FT

## (undated) DEVICE — SOL IRR NACL 0.9PCT 1000ML

## (undated) DEVICE — UNDERGLV SURG BIOGEL INDICAT PF 8 GRN

## (undated) DEVICE — TOTAL TRAY, DB, 100% SILI FOLEY, 16FR 10: Brand: MEDLINE

## (undated) DEVICE — SYR LUERLOK 30CC

## (undated) DEVICE — ENDOPOUCH RETRIEVER SPECIMEN RETRIEVAL BAGS: Brand: ENDOPOUCH RETRIEVER

## (undated) DEVICE — ANTIBACTERIAL UNDYED BRAIDED (POLYGLACTIN 910), SYNTHETIC ABSORBABLE SUTURE: Brand: COATED VICRYL

## (undated) DEVICE — INSUFFLATION TUBING SET, ENDOFLATOR 50: Brand: N.A.

## (undated) DEVICE — KT SURG TURNOVER 050

## (undated) DEVICE — ENDOPATH XCEL WITH OPTIVIEW TECHNOLOGY UNIVERSAL TROCAR STABILITY SLEEVES: Brand: ENDOPATH XCEL OPTIVIEW

## (undated) DEVICE — MARYLAND JAW LAPAROSCOPIC SEALER/DIVIDER COATED: Brand: LIGASURE

## (undated) DEVICE — SUT VIC 0 UR6 27IN VCP603H